# Patient Record
Sex: MALE | Race: WHITE | URBAN - METROPOLITAN AREA
[De-identification: names, ages, dates, MRNs, and addresses within clinical notes are randomized per-mention and may not be internally consistent; named-entity substitution may affect disease eponyms.]

---

## 2017-07-14 ENCOUNTER — HOSPITAL ENCOUNTER (OUTPATIENT)
Dept: ENDOSCOPY | Age: 62
Discharge: HOME | End: 2017-07-14
Attending: INTERNAL MEDICINE | Admitting: INTERNAL MEDICINE

## 2017-07-14 VITALS
OXYGEN SATURATION: 97 % | HEART RATE: 62 BPM | DIASTOLIC BLOOD PRESSURE: 65 MMHG | RESPIRATION RATE: 18 BRPM | TEMPERATURE: 97.6 F | BODY MASS INDEX: 26.33 KG/M2 | HEIGHT: 76 IN | SYSTOLIC BLOOD PRESSURE: 137 MMHG | WEIGHT: 216.2 LBS

## 2017-07-14 RX ORDER — SODIUM CHLORIDE 9 MG/ML
INJECTION, SOLUTION INTRAVENOUS CONTINUOUS
Status: DISCONTINUED | OUTPATIENT
Start: 2017-07-14 | End: 2017-07-15 | Stop reason: HOSPADM

## 2017-07-14 RX ADMIN — SODIUM CHLORIDE: 9 INJECTION, SOLUTION INTRAVENOUS at 12:14

## 2017-07-14 ASSESSMENT — PAIN SCALES - GENERAL
PAINLEVEL_OUTOF10: 0

## 2017-07-14 ASSESSMENT — PAIN DESCRIPTION - DESCRIPTORS: DESCRIPTORS: BURNING

## 2017-07-14 ASSESSMENT — PAIN - FUNCTIONAL ASSESSMENT: PAIN_FUNCTIONAL_ASSESSMENT: 0-10

## 2017-08-07 ENCOUNTER — SURG/PROC ORDERS (OUTPATIENT)
Dept: ANESTHESIOLOGY | Age: 62
End: 2017-08-07

## 2017-08-07 RX ORDER — SODIUM CHLORIDE 0.9 % (FLUSH) 0.9 %
10 SYRINGE (ML) INJECTION PRN
Status: CANCELLED | OUTPATIENT
Start: 2017-08-07

## 2017-08-07 RX ORDER — SODIUM CHLORIDE 0.9 % (FLUSH) 0.9 %
10 SYRINGE (ML) INJECTION EVERY 12 HOURS SCHEDULED
Status: CANCELLED | OUTPATIENT
Start: 2017-08-07

## 2017-08-07 RX ORDER — SODIUM CHLORIDE 9 MG/ML
INJECTION, SOLUTION INTRAVENOUS CONTINUOUS
Status: CANCELLED | OUTPATIENT
Start: 2017-08-07

## 2017-08-08 ENCOUNTER — HOSPITAL ENCOUNTER (OUTPATIENT)
Dept: ENDOSCOPY | Age: 62
Discharge: HOME | End: 2017-08-08
Attending: INTERNAL MEDICINE | Admitting: INTERNAL MEDICINE

## 2017-08-08 VITALS
BODY MASS INDEX: 25.45 KG/M2 | WEIGHT: 209 LBS | TEMPERATURE: 97.9 F | SYSTOLIC BLOOD PRESSURE: 126 MMHG | HEART RATE: 74 BPM | OXYGEN SATURATION: 98 % | RESPIRATION RATE: 16 BRPM | HEIGHT: 76 IN | DIASTOLIC BLOOD PRESSURE: 94 MMHG

## 2017-08-08 RX ORDER — SODIUM CHLORIDE 0.9 % (FLUSH) 0.9 %
10 SYRINGE (ML) INJECTION PRN
Status: DISCONTINUED | OUTPATIENT
Start: 2017-08-08 | End: 2017-08-09 | Stop reason: HOSPADM

## 2017-08-08 RX ORDER — SODIUM CHLORIDE 9 MG/ML
INJECTION, SOLUTION INTRAVENOUS CONTINUOUS
Status: DISCONTINUED | OUTPATIENT
Start: 2017-08-08 | End: 2017-08-09 | Stop reason: HOSPADM

## 2017-08-08 RX ORDER — SODIUM CHLORIDE 0.9 % (FLUSH) 0.9 %
10 SYRINGE (ML) INJECTION EVERY 12 HOURS SCHEDULED
Status: DISCONTINUED | OUTPATIENT
Start: 2017-08-08 | End: 2017-08-09 | Stop reason: HOSPADM

## 2017-08-08 RX ADMIN — SODIUM CHLORIDE: 9 INJECTION, SOLUTION INTRAVENOUS at 08:49

## 2017-08-08 ASSESSMENT — PAIN - FUNCTIONAL ASSESSMENT: PAIN_FUNCTIONAL_ASSESSMENT: 0-10

## 2017-08-08 ASSESSMENT — PAIN SCALES - GENERAL
PAINLEVEL_OUTOF10: 0

## 2017-08-19 PROBLEM — K81.9 CHOLECYSTITIS: Status: ACTIVE | Noted: 2017-08-19

## 2017-08-28 ENCOUNTER — OFFICE VISIT (OUTPATIENT)
Dept: SURGERY | Age: 62
End: 2017-08-28

## 2017-08-28 DIAGNOSIS — K81.9 CHOLECYSTITIS: Primary | ICD-10-CM

## 2017-08-28 PROCEDURE — 99024 POSTOP FOLLOW-UP VISIT: CPT | Performed by: SURGERY

## 2019-04-15 ENCOUNTER — OFFICE VISIT (OUTPATIENT)
Dept: ORTHOPEDIC SURGERY | Age: 64
End: 2019-04-15
Payer: COMMERCIAL

## 2019-04-15 VITALS
DIASTOLIC BLOOD PRESSURE: 87 MMHG | SYSTOLIC BLOOD PRESSURE: 137 MMHG | HEART RATE: 77 BPM | BODY MASS INDEX: 30.44 KG/M2 | HEIGHT: 76 IN | WEIGHT: 250 LBS

## 2019-04-15 DIAGNOSIS — M65.342 TRIGGER RING FINGER OF LEFT HAND: Primary | ICD-10-CM

## 2019-04-15 PROCEDURE — 20550 NJX 1 TENDON SHEATH/LIGAMENT: CPT | Performed by: ORTHOPAEDIC SURGERY

## 2019-04-15 PROCEDURE — 99203 OFFICE O/P NEW LOW 30 MIN: CPT | Performed by: ORTHOPAEDIC SURGERY

## 2019-04-15 NOTE — PROGRESS NOTES
Kenalog:  ND: [id number]  Lot Number: [id number]  Expiration Date: 4/2020
alcohol and the flexor tendon sheath of the left ring finger is injected with 1/2 milliliter of 1% lidocaine and 20 mg.of triamcinalone, with good filling. The patient is advised regarding the expected response and possible reactions from the injection. The patient is asked to call me if full, painless function has not returned within 4 weeks. The possibility of recurrence of the problem is discussed.

## 2019-06-27 ENCOUNTER — OFFICE VISIT (OUTPATIENT)
Dept: ORTHOPEDIC SURGERY | Age: 64
End: 2019-06-27
Payer: COMMERCIAL

## 2019-06-27 VITALS
RESPIRATION RATE: 16 BRPM | BODY MASS INDEX: 30.44 KG/M2 | SYSTOLIC BLOOD PRESSURE: 125 MMHG | HEIGHT: 76 IN | DIASTOLIC BLOOD PRESSURE: 82 MMHG | WEIGHT: 250 LBS

## 2019-06-27 DIAGNOSIS — M65.342 TRIGGER RING FINGER OF LEFT HAND: Primary | ICD-10-CM

## 2019-06-27 PROCEDURE — 99212 OFFICE O/P EST SF 10 MIN: CPT | Performed by: ORTHOPAEDIC SURGERY

## 2019-06-27 NOTE — PROGRESS NOTES
Patient returns to the office for evaluation of   Chief Complaint   Patient presents with    Hand Problem     all fingers are stiff on the left hand, difficulty gripping   I last examined this patient 2 months ago at which time I injected the left ring finger for treatment of trigger finger. He obtained prompt and complete relief of all symptoms. The patient returns to the office requesting additional treatment. He complains of aching in the left distal palm and morning stiffness of all fingers of the left hand. Symptoms have become not changed recently. The patient's social history, past medical history, family history, medications, allergies and review of systems have been reviewed, and dated 4/15/19 and are recorded in the chart. On examination there is mild soft tissue swelling of the digits of the left hand. There is no deformity. There is minimal tenderness, thickening and nodularity at the base of the flexor tendon sheaths, with the exception of the ring finger. Range of motion is not limited in flexion or extension. There is no triggering of any digit, even with manual pressure over the A1 pulleys. Skin is intact without lesions. Distal circulation and sensation are intact. Muscle strength and coordination are normal.  Reflexes and present bilaterally. Joints are stable. There are no subcutaneous nodules or enlarged epitrochlear lymph nodes. Impression: Mild residual tenosynovitis of several fingers, left hand. Previous left ring finger symptoms have fully resolved post injection. The nature of this medical problem is fully discussed with the patient, including all treatment options. All questions are answered. Neither steroid injection nor surgery are indicated at this time. He is advised to take a small dose (200 MG) of ibuprofen at ,  for the next several nights to see if that might help his morning stiffness.

## 2019-08-01 ENCOUNTER — OFFICE VISIT (OUTPATIENT)
Dept: ORTHOPEDIC SURGERY | Age: 64
End: 2019-08-01
Payer: COMMERCIAL

## 2019-08-01 VITALS
WEIGHT: 248 LBS | BODY MASS INDEX: 30.2 KG/M2 | DIASTOLIC BLOOD PRESSURE: 94 MMHG | SYSTOLIC BLOOD PRESSURE: 147 MMHG | RESPIRATION RATE: 16 BRPM | HEIGHT: 76 IN

## 2019-08-01 DIAGNOSIS — M65.9 TENOSYNOVITIS OF LEFT WRIST: Primary | ICD-10-CM

## 2019-08-01 PROCEDURE — 99213 OFFICE O/P EST LOW 20 MIN: CPT | Performed by: ORTHOPAEDIC SURGERY

## 2019-08-01 PROCEDURE — 20605 DRAIN/INJ JOINT/BURSA W/O US: CPT | Performed by: ORTHOPAEDIC SURGERY

## 2019-08-01 NOTE — PROGRESS NOTES
This 62 y.o. right hand dominant retired man is seen today with a chief complaint of pain in the left wrist.  Symptoms have been present for 2 weeks. There is no history of injury. Pain is most severe on the volar aspect. It is mild at rest and aggravated by wrist movement, lifting and gripping. It does not radiate. Swelling has been noticed. There is no distal pain or paresthesias. The previous ring trigger finger continues to do well. Similar pain is not noted in the opposite upper extremity. Symptoms have worsened recently. The pain assessment has been reviewed and is correct. The patient's social history, past medical history, family history, medications, allergies and review of systems, entered 4/15/10, have been reviewed, and dated and are recorded in the chart. On physical examination the patient is Height: 6' 4\" (193 cm) tall and weighs Weight: 248 lb (112.5 kg). Respirations are 18 per minute. The patient is well nourished, is oriented to time and place, demonstrates appropriate mood and affect as well as normal gait and station. There is moderate soft tissue swelling present about the left wrist, palmar. There is no discoloration. There is no deformity or atrophy. Tenderness is present on palpation in this area. Range of motion of the wrist is mildly limited only on the left and is accompanied by mild pain. Skin is intact, as is distal circulation and sensation. Gross muscle strength is normal bilaterally. Hand and wrist joints are stable. There are no subcutaneous nodules or enlarged epitrochlear lymph nodes. Xrays: AP, lateral and oblique Xrays of the left wrist, done in the office today, demonstrate no significant underlying bone or joint disease.  There is severe swelling of the flexor tendons volar to the wrist..    Impression: Flexor tenosynovitis left wrist.    The nature of this medical problem is fully discussed with the patient, including it's relationship to his finger problems and all treatment options. All questions are answered. The left wrist is prepared with Betadine and alcohol and the carpal tunnel is injected with 2 milliliters of a mixture of 1 ml of 1% lidocaine and 40 mg. of Kenalog. The patient is instructed regarding the expected response and possible reactions to the injection as well as appropriate post injection care. They are to call me in one month to report his response The possibility of recurrence is discussed.

## 2019-09-03 ENCOUNTER — TELEPHONE (OUTPATIENT)
Dept: ORTHOPEDIC SURGERY | Age: 64
End: 2019-09-03

## 2019-09-04 ENCOUNTER — TELEPHONE (OUTPATIENT)
Dept: ORTHOPEDIC SURGERY | Age: 64
End: 2019-09-04

## 2020-08-04 ENCOUNTER — APPOINTMENT (OUTPATIENT)
Dept: GENERAL RADIOLOGY | Age: 65
DRG: 177 | End: 2020-08-04
Payer: COMMERCIAL

## 2020-08-04 ENCOUNTER — HOSPITAL ENCOUNTER (INPATIENT)
Age: 65
LOS: 10 days | Discharge: HOME | DRG: 177 | End: 2020-08-14
Attending: EMERGENCY MEDICINE | Admitting: INTERNAL MEDICINE
Payer: COMMERCIAL

## 2020-08-04 PROBLEM — I10 ESSENTIAL HYPERTENSION: Status: ACTIVE | Noted: 2020-08-04

## 2020-08-04 PROBLEM — E78.5 HYPERLIPIDEMIA: Status: ACTIVE | Noted: 2020-08-04

## 2020-08-04 PROBLEM — J96.00 ACUTE RESPIRATORY FAILURE DUE TO COVID-19 (HCC): Status: ACTIVE | Noted: 2020-08-04

## 2020-08-04 PROBLEM — U07.1 ACUTE RESPIRATORY FAILURE DUE TO COVID-19 (HCC): Status: ACTIVE | Noted: 2020-08-04

## 2020-08-04 PROBLEM — U07.1 COVID-19 VIRUS INFECTION: Status: ACTIVE | Noted: 2020-08-04

## 2020-08-04 LAB
A/G RATIO: 1.3 (ref 1.1–2.2)
A/G RATIO: 1.8 (ref 1.1–2.2)
ALBUMIN SERPL-MCNC: 3.3 G/DL (ref 3.4–5)
ALBUMIN SERPL-MCNC: 3.5 G/DL (ref 3.4–5)
ALP BLD-CCNC: 103 U/L (ref 40–129)
ALP BLD-CCNC: 107 U/L (ref 40–129)
ALT SERPL-CCNC: 93 U/L (ref 10–40)
ALT SERPL-CCNC: 96 U/L (ref 10–40)
ANION GAP SERPL CALCULATED.3IONS-SCNC: 15 MMOL/L (ref 3–16)
ANION GAP SERPL CALCULATED.3IONS-SCNC: 18 MMOL/L (ref 3–16)
AST SERPL-CCNC: 138 U/L (ref 15–37)
AST SERPL-CCNC: 144 U/L (ref 15–37)
BASOPHILS ABSOLUTE: 0 K/UL (ref 0–0.2)
BASOPHILS RELATIVE PERCENT: 0.1 %
BILIRUB SERPL-MCNC: 0.8 MG/DL (ref 0–1)
BILIRUB SERPL-MCNC: 0.8 MG/DL (ref 0–1)
BUN BLDV-MCNC: 17 MG/DL (ref 7–20)
BUN BLDV-MCNC: 18 MG/DL (ref 7–20)
CALCIUM SERPL-MCNC: 8.1 MG/DL (ref 8.3–10.6)
CALCIUM SERPL-MCNC: 8.2 MG/DL (ref 8.3–10.6)
CHLORIDE BLD-SCNC: 101 MMOL/L (ref 99–110)
CHLORIDE BLD-SCNC: 103 MMOL/L (ref 99–110)
CO2: 23 MMOL/L (ref 21–32)
CO2: 23 MMOL/L (ref 21–32)
CREAT SERPL-MCNC: 1 MG/DL (ref 0.8–1.3)
CREAT SERPL-MCNC: 1.1 MG/DL (ref 0.8–1.3)
D DIMER: 445 NG/ML DDU (ref 0–229)
EKG ATRIAL RATE: 94 BPM
EKG DIAGNOSIS: NORMAL
EKG P AXIS: 58 DEGREES
EKG P-R INTERVAL: 196 MS
EKG Q-T INTERVAL: 370 MS
EKG QRS DURATION: 88 MS
EKG QTC CALCULATION (BAZETT): 462 MS
EKG R AXIS: 15 DEGREES
EKG T AXIS: 38 DEGREES
EKG VENTRICULAR RATE: 94 BPM
EOSINOPHILS ABSOLUTE: 0 K/UL (ref 0–0.6)
EOSINOPHILS RELATIVE PERCENT: 0 %
FERRITIN: 3295 NG/ML (ref 30–400)
GFR AFRICAN AMERICAN: >60
GFR AFRICAN AMERICAN: >60
GFR NON-AFRICAN AMERICAN: >60
GFR NON-AFRICAN AMERICAN: >60
GLOBULIN: 2 G/DL
GLOBULIN: 2.6 G/DL
GLUCOSE BLD-MCNC: 138 MG/DL (ref 70–99)
GLUCOSE BLD-MCNC: 138 MG/DL (ref 70–99)
HCT VFR BLD CALC: 43.6 % (ref 40.5–52.5)
HEMOGLOBIN: 14.8 G/DL (ref 13.5–17.5)
LACTATE DEHYDROGENASE: 548 U/L (ref 100–190)
LACTIC ACID: 1.5 MMOL/L (ref 0.4–2)
LYMPHOCYTES ABSOLUTE: 0.8 K/UL (ref 1–5.1)
LYMPHOCYTES RELATIVE PERCENT: 11.8 %
MAGNESIUM: 2.1 MG/DL (ref 1.8–2.4)
MAGNESIUM: 2.3 MG/DL (ref 1.8–2.4)
MCH RBC QN AUTO: 29 PG (ref 26–34)
MCHC RBC AUTO-ENTMCNC: 34.1 G/DL (ref 31–36)
MCV RBC AUTO: 85.1 FL (ref 80–100)
MONOCYTES ABSOLUTE: 0.2 K/UL (ref 0–1.3)
MONOCYTES RELATIVE PERCENT: 3.8 %
NEUTROPHILS ABSOLUTE: 5.4 K/UL (ref 1.7–7.7)
NEUTROPHILS RELATIVE PERCENT: 84.3 %
PDW BLD-RTO: 13.2 % (ref 12.4–15.4)
PLATELET # BLD: 132 K/UL (ref 135–450)
PMV BLD AUTO: 7.2 FL (ref 5–10.5)
POTASSIUM REFLEX MAGNESIUM: 3.2 MMOL/L (ref 3.5–5.1)
POTASSIUM REFLEX MAGNESIUM: 3.3 MMOL/L (ref 3.5–5.1)
RBC # BLD: 5.12 M/UL (ref 4.2–5.9)
SODIUM BLD-SCNC: 141 MMOL/L (ref 136–145)
SODIUM BLD-SCNC: 142 MMOL/L (ref 136–145)
TOTAL PROTEIN: 5.5 G/DL (ref 6.4–8.2)
TOTAL PROTEIN: 5.9 G/DL (ref 6.4–8.2)
TROPONIN: <0.01 NG/ML
WBC # BLD: 6.4 K/UL (ref 4–11)

## 2020-08-04 PROCEDURE — 80053 COMPREHEN METABOLIC PANEL: CPT

## 2020-08-04 PROCEDURE — 93010 ELECTROCARDIOGRAM REPORT: CPT | Performed by: INTERNAL MEDICINE

## 2020-08-04 PROCEDURE — 83735 ASSAY OF MAGNESIUM: CPT

## 2020-08-04 PROCEDURE — 71045 X-RAY EXAM CHEST 1 VIEW: CPT

## 2020-08-04 PROCEDURE — 6370000000 HC RX 637 (ALT 250 FOR IP): Performed by: INTERNAL MEDICINE

## 2020-08-04 PROCEDURE — 87040 BLOOD CULTURE FOR BACTERIA: CPT

## 2020-08-04 PROCEDURE — 2580000003 HC RX 258: Performed by: INTERNAL MEDICINE

## 2020-08-04 PROCEDURE — 6370000000 HC RX 637 (ALT 250 FOR IP)

## 2020-08-04 PROCEDURE — 85379 FIBRIN DEGRADATION QUANT: CPT

## 2020-08-04 PROCEDURE — 83615 LACTATE (LD) (LDH) ENZYME: CPT

## 2020-08-04 PROCEDURE — 6360000002 HC RX W HCPCS: Performed by: EMERGENCY MEDICINE

## 2020-08-04 PROCEDURE — 93005 ELECTROCARDIOGRAM TRACING: CPT | Performed by: EMERGENCY MEDICINE

## 2020-08-04 PROCEDURE — 1200000000 HC SEMI PRIVATE

## 2020-08-04 PROCEDURE — 82728 ASSAY OF FERRITIN: CPT

## 2020-08-04 PROCEDURE — 84484 ASSAY OF TROPONIN QUANT: CPT

## 2020-08-04 PROCEDURE — 85025 COMPLETE CBC W/AUTO DIFF WBC: CPT

## 2020-08-04 PROCEDURE — 6360000002 HC RX W HCPCS: Performed by: INTERNAL MEDICINE

## 2020-08-04 PROCEDURE — 83605 ASSAY OF LACTIC ACID: CPT

## 2020-08-04 PROCEDURE — 99285 EMERGENCY DEPT VISIT HI MDM: CPT

## 2020-08-04 RX ORDER — DEXAMETHASONE 6 MG/1
6 TABLET ORAL DAILY
Status: COMPLETED | OUTPATIENT
Start: 2020-08-05 | End: 2020-08-14

## 2020-08-04 RX ORDER — BENZONATATE 100 MG/1
200 CAPSULE ORAL 3 TIMES DAILY PRN
Status: DISCONTINUED | OUTPATIENT
Start: 2020-08-04 | End: 2020-08-14 | Stop reason: HOSPADM

## 2020-08-04 RX ORDER — POLYETHYLENE GLYCOL 3350 17 G/17G
17 POWDER, FOR SOLUTION ORAL DAILY PRN
Status: DISCONTINUED | OUTPATIENT
Start: 2020-08-04 | End: 2020-08-14 | Stop reason: HOSPADM

## 2020-08-04 RX ORDER — LAMOTRIGINE 150 MG/1
150 TABLET ORAL 2 TIMES DAILY
COMMUNITY

## 2020-08-04 RX ORDER — SODIUM CHLORIDE 0.9 % (FLUSH) 0.9 %
10 SYRINGE (ML) INJECTION PRN
Status: DISCONTINUED | OUTPATIENT
Start: 2020-08-04 | End: 2020-08-14 | Stop reason: HOSPADM

## 2020-08-04 RX ORDER — AMLODIPINE BESYLATE 5 MG/1
5 TABLET ORAL DAILY
COMMUNITY

## 2020-08-04 RX ORDER — LISINOPRIL 20 MG/1
20 TABLET ORAL DAILY
Status: DISCONTINUED | OUTPATIENT
Start: 2020-08-05 | End: 2020-08-14 | Stop reason: HOSPADM

## 2020-08-04 RX ORDER — QUETIAPINE FUMARATE 100 MG/1
100 TABLET, FILM COATED ORAL 2 TIMES DAILY
COMMUNITY

## 2020-08-04 RX ORDER — SODIUM CHLORIDE 0.9 % (FLUSH) 0.9 %
10 SYRINGE (ML) INJECTION EVERY 12 HOURS SCHEDULED
Status: DISCONTINUED | OUTPATIENT
Start: 2020-08-04 | End: 2020-08-14 | Stop reason: HOSPADM

## 2020-08-04 RX ORDER — PRAVASTATIN SODIUM 40 MG
40 TABLET ORAL NIGHTLY
Status: ON HOLD | COMMUNITY
End: 2020-08-14 | Stop reason: HOSPADM

## 2020-08-04 RX ORDER — ASPIRIN 81 MG/1
81 TABLET ORAL DAILY
COMMUNITY

## 2020-08-04 RX ORDER — AMLODIPINE BESYLATE 5 MG/1
5 TABLET ORAL DAILY
Status: DISCONTINUED | OUTPATIENT
Start: 2020-08-05 | End: 2020-08-14 | Stop reason: HOSPADM

## 2020-08-04 RX ORDER — DEXAMETHASONE 4 MG/1
6 TABLET ORAL ONCE
Status: COMPLETED | OUTPATIENT
Start: 2020-08-04 | End: 2020-08-04

## 2020-08-04 RX ORDER — POTASSIUM CHLORIDE 750 MG/1
40 TABLET, FILM COATED, EXTENDED RELEASE ORAL ONCE
Status: DISCONTINUED | OUTPATIENT
Start: 2020-08-04 | End: 2020-08-14 | Stop reason: HOSPADM

## 2020-08-04 RX ORDER — QUETIAPINE FUMARATE 100 MG/1
100 TABLET, FILM COATED ORAL 2 TIMES DAILY
Status: DISCONTINUED | OUTPATIENT
Start: 2020-08-04 | End: 2020-08-14 | Stop reason: HOSPADM

## 2020-08-04 RX ORDER — PRAVASTATIN SODIUM 40 MG
40 TABLET ORAL NIGHTLY
Status: DISCONTINUED | OUTPATIENT
Start: 2020-08-04 | End: 2020-08-14 | Stop reason: HOSPADM

## 2020-08-04 RX ORDER — ALBUTEROL SULFATE 90 UG/1
2 AEROSOL, METERED RESPIRATORY (INHALATION) EVERY 6 HOURS PRN
Status: DISCONTINUED | OUTPATIENT
Start: 2020-08-04 | End: 2020-08-14 | Stop reason: HOSPADM

## 2020-08-04 RX ORDER — LISINOPRIL 20 MG/1
20 TABLET ORAL DAILY
COMMUNITY

## 2020-08-04 RX ORDER — ACETAMINOPHEN 650 MG/1
650 SUPPOSITORY RECTAL EVERY 4 HOURS PRN
Status: DISCONTINUED | OUTPATIENT
Start: 2020-08-04 | End: 2020-08-14 | Stop reason: HOSPADM

## 2020-08-04 RX ORDER — ASPIRIN 81 MG/1
81 TABLET ORAL DAILY
Status: DISCONTINUED | OUTPATIENT
Start: 2020-08-05 | End: 2020-08-14 | Stop reason: HOSPADM

## 2020-08-04 RX ORDER — POTASSIUM CHLORIDE 20 MEQ/1
TABLET, EXTENDED RELEASE ORAL
Status: COMPLETED
Start: 2020-08-04 | End: 2020-08-04

## 2020-08-04 RX ORDER — ACETAMINOPHEN 325 MG/1
650 TABLET ORAL EVERY 4 HOURS PRN
Status: DISCONTINUED | OUTPATIENT
Start: 2020-08-04 | End: 2020-08-14 | Stop reason: HOSPADM

## 2020-08-04 RX ORDER — ONDANSETRON 2 MG/ML
4 INJECTION INTRAMUSCULAR; INTRAVENOUS EVERY 4 HOURS PRN
Status: DISCONTINUED | OUTPATIENT
Start: 2020-08-04 | End: 2020-08-14 | Stop reason: HOSPADM

## 2020-08-04 RX ADMIN — DEXAMETHASONE 6 MG: 4 TABLET ORAL at 16:07

## 2020-08-04 RX ADMIN — POTASSIUM CHLORIDE 40 MEQ: 1500 TABLET, EXTENDED RELEASE ORAL at 21:18

## 2020-08-04 RX ADMIN — PRAVASTATIN SODIUM 40 MG: 40 TABLET ORAL at 21:18

## 2020-08-04 RX ADMIN — QUETIAPINE FUMARATE 100 MG: 100 TABLET ORAL at 21:18

## 2020-08-04 RX ADMIN — LAMOTRIGINE 150 MG: 100 TABLET ORAL at 21:18

## 2020-08-04 RX ADMIN — SODIUM CHLORIDE, PRESERVATIVE FREE 10 ML: 5 INJECTION INTRAVENOUS at 21:26

## 2020-08-04 RX ADMIN — ENOXAPARIN SODIUM 30 MG: 40 INJECTION SUBCUTANEOUS at 21:17

## 2020-08-04 ASSESSMENT — PAIN SCALES - GENERAL
PAINLEVEL_OUTOF10: 0
PAINLEVEL_OUTOF10: 7
PAINLEVEL_OUTOF10: 0

## 2020-08-04 ASSESSMENT — PAIN DESCRIPTION - LOCATION: LOCATION: CHEST

## 2020-08-04 ASSESSMENT — ENCOUNTER SYMPTOMS
SORE THROAT: 0
VOMITING: 0
NAUSEA: 0
COUGH: 1
EYE REDNESS: 0
SHORTNESS OF BREATH: 1

## 2020-08-04 ASSESSMENT — PAIN DESCRIPTION - DESCRIPTORS: DESCRIPTORS: DISCOMFORT

## 2020-08-04 ASSESSMENT — PAIN DESCRIPTION - FREQUENCY: FREQUENCY: INTERMITTENT

## 2020-08-04 ASSESSMENT — PAIN DESCRIPTION - PAIN TYPE: TYPE: ACUTE PAIN

## 2020-08-04 NOTE — ED PROVIDER NOTES
[facility]  eMERGENCY dEPARTMENT eNCOUnter      Pt Name: Brittnee Rae  MRN: [id number]  [place] 1955  Date of evaluation: 8/4/2020  Provider: Aguila Mckeon MD    CHIEF COMPLAINT       Chief Complaint   Patient presents with    Shortness of Breath     covid positive. followed up with pcp. stated there was nothing for them to do so he came to ER.  Cough         HISTORY OF PRESENT ILLNESS   (Location/Symptom, Timing/Onset,Context/Setting, Quality, Duration, Modifying Factors, Severity)  Note limiting factors. Brittnee Rae is a 60 y.o. male who presents to the emergency department c/o shortness of breath. This patient states that last Monday he tested positive for COVID. He is on day 8 or 9. He reports that he continues with a dry, nonproductive cough. His shortness of breath is worsening and he has associated fever, body aches. No sore throat. Comorbidities include: Hyperlipidemia and hypertension. His BMI is 29.2    HPI    NursingNotes were reviewed. REVIEW OF SYSTEMS    (2-9 systems for level 4, 10 or more for level 5)     Review of Systems   Constitutional: Positive for fever. HENT: Negative for sore throat. Eyes: Negative for redness. Respiratory: Positive for cough and shortness of breath. Cardiovascular: Negative for chest pain. Gastrointestinal: Negative for nausea and vomiting. Genitourinary: Negative for dysuria and frequency. Musculoskeletal: Positive for myalgias. Skin: Negative for rash. Neurological: Positive for headaches. Psychiatric/Behavioral: Negative for confusion. Except as noted above the remainder of the review of systems was reviewed and negative.        PAST MEDICAL HISTORY     Past Medical History:   Diagnosis Date    GERD (gastroesophageal reflux disease)     Hyperlipidemia     Hypertension     Kidney stone          SURGICALHISTORY       Past Surgical History:   Procedure Laterality Date    CHOLECYSTECTOMY  08/19/2017    Lap    COLONOSCOPY  10/16/2015    CYSTOSCOPY      KIDNEY STONES    ENDOSCOPY, COLON, DIAGNOSTIC  08/08/2017    UPPER GASTROINTESTINAL ENDOSCOPY  07/14/2017         CURRENT MEDICATIONS       Previous Medications    AMLODIPINE (NORVASC) 5 MG TABLET    Take 5 mg by mouth daily    ASPIRIN 81 MG EC TABLET    Take 81 mg by mouth daily    LAMOTRIGINE (LAMICTAL) 150 MG TABLET    Take 150 mg by mouth 2 times daily    LISINOPRIL (PRINIVIL;ZESTRIL) 20 MG TABLET    Take 20 mg by mouth daily    PRAVASTATIN (PRAVACHOL) 40 MG TABLET    Take 40 mg by mouth nightly     QUETIAPINE (SEROQUEL) 100 MG TABLET    Take 100 mg by mouth 2 times daily       ALLERGIES     Bee pollen    FAMILY HISTORY       Family History   Problem Relation Age of Onset    Heart Disease Mother     Stroke Mother     Cancer Father         STOMACH          SOCIAL HISTORY       Social History     Socioeconomic History    Marital status:      Spouse name: Not on file    Number of children: Not on file    Years of education: Not on file    Highest education level: Not on file   Occupational History    Not on file   Social Needs    Financial resource strain: Not on file    Food insecurity     Worry: Not on file     Inability: Not on file    Transportation needs     Medical: Not on file     Non-medical: Not on file   Tobacco Use    Smoking status: Never Smoker    Smokeless tobacco: Never Used   Substance and Sexual Activity    Alcohol use: Yes     Comment: occ    Drug use: No    Sexual activity: Not on file   Lifestyle    Physical activity     Days per week: Not on file     Minutes per session: Not on file    Stress: Not on file   Relationships    Social connections     Talks on phone: Not on file     Gets together: Not on file     Attends Evangelical service: Not on file     Active member of club or organization: Not on file     Attends meetings of clubs or organizations: Not on file     Relationship status: Not on file    Intimate partner violence     Fear of current or ex partner: Not on file     Emotionally abused: Not on file     Physically abused: Not on file     Forced sexual activity: Not on file   Other Topics Concern    Not on file   Social History Narrative    Not on file       SCREENINGS             PHYSICAL EXAM    (up to 7 for level 4, 8 or more for level 5)     ED Triage Vitals [08/04/20 1330]   BP Temp Temp Source Pulse Resp SpO2 Height Weight   108/63 98.1 °F (36.7 °C) Oral 98 22 (!) 85 % 6' 4\" (1.93 m) 239 lb 13.8 oz (108.8 kg)       Physical Exam  Vitals signs and nursing note reviewed. Constitutional:       Appearance: He is well-developed. He is not diaphoretic. HENT:      Head: Normocephalic and atraumatic. Eyes:      General:         Right eye: No discharge. Left eye: No discharge. Conjunctiva/sclera: Conjunctivae normal.   Neck:      Musculoskeletal: Neck supple. Cardiovascular:      Rate and Rhythm: Normal rate. Heart sounds: No murmur. Pulmonary:      Effort: Pulmonary effort is normal. No respiratory distress. Breath sounds: Normal breath sounds. No wheezing, rhonchi or rales. Abdominal:      Palpations: Abdomen is soft. Tenderness: There is no abdominal tenderness. Musculoskeletal: Normal range of motion. Right lower leg: No edema. Left lower leg: No edema. Skin:     General: Skin is warm and dry. Capillary Refill: Capillary refill takes less than 2 seconds. Neurological:      Mental Status: He is alert and oriented to person, place, and time. Psychiatric:         Behavior: Behavior normal.         DIAGNOSTIC RESULTS     EKG: All EKG's are interpreted by the Emergency Department Physician who either signs or Co-signsthis chart in the absence of a cardiologist.    The Ekg interpreted by me shows  normal sinus rhythm with a rate of 94  Axis is   Normal  QTc is  462 msec  Intervals and Durations are unremarkable.       ST Segments: normal  When compared to an EKG performed on July 8, 2017 there are no significant differences between these 2 EKGs        RADIOLOGY:   Non-plain filmimages such as CT, Ultrasound and MRI are read by the radiologist. Plain radiographic images are visualized and preliminarily interpreted by the emergency physician with the below findings:        Interpretation per the Radiologist below, if available at the time ofthis note:    XR CHEST 1 VIEW   Preliminary Result   Multifocal curvilinear and hazy opacities within the bilateral mid and lower   lung zones, most consistent with multifocal pneumonia, to include atypical   causes.                ED BEDSIDE ULTRASOUND:   Performed by ED Physician - none    LABS:  Labs Reviewed   CBC WITH AUTO DIFFERENTIAL - Abnormal; Notable for the following components:       Result Value    Platelets 862 (*)     Lymphocytes Absolute 0.8 (*)     All other components within normal limits    Narrative:     Performed at:  [address]   Phone [phone]   COMPREHENSIVE METABOLIC PANEL W/ REFLEX TO MG FOR LOW K - Abnormal; Notable for the following components:    Potassium reflex Magnesium 3.2 (*)     Glucose 138 (*)     Calcium 8.2 (*)     Total Protein 5.9 (*)     Alb 3.3 (*)     ALT 93 (*)      (*)     All other components within normal limits    Narrative:     Performed at:  [facility]  [address] [facility] [address]   Phone [phone]   D-DIMER, QUANTITATIVE - Abnormal; Notable for the following components:    D-Dimer, Quant 445 (*)     All other components within normal limits    Narrative:     Performed at:  [facility]  [address] [facility] [address]   Phone [phone]   CULTURE, BLOOD 2   CULTURE, BLOOD 1   TROPONIN    Narrative:     Performed at:  [facility]  [address]

## 2020-08-04 NOTE — ED NOTES
Report called to nurse Brown at this time. VS as noted. Questions/concerns addressed. No further concerns voiced at this time.       Tu Gomez RN  08/04/20 [id number]

## 2020-08-04 NOTE — H&P
Hospital Medicine  History and Physical    PCP: Nitza Leighton  Patient Name: Hunter Ulloa    Date of Service: Pt seen/examined on 08/04/2020 and admitted to Inpatient with expected LOS greater than two midnights due to medical therapy    CHIEF COMPLAINT:  Pt c/o cough, shortness of breath  HISTORY OF PRESENT ILLNESS: Patient is a 72-year-old man with history of hypertension and hyperlipidemia who presents to the emergency room for evaluation of cough and shortness of breath. He states that he was diagnosed with COVID-19 is an outpatient on or about 7/27/2020. Approximately three weeks ago he was on vacation with 25 other people at the [facility] in [place]. Since that time several of his companions have been diagnosed with COVID-19. Over the past several days here developed a cough and worsening shortness of breath. In the emergency room he was found to have an oxygen saturation in the mid 80s on room air. He is being admitted for further evaluation and treatment. Associated signs and symptoms do not include fever or chills, nausea, vomiting, abdominal pain, hemoptysis, hematochezia, diarrhea, constipation or urinary symptoms. Past Medical History:        Diagnosis Date    GERD (gastroesophageal reflux disease)     Hyperlipidemia     Hypertension     Kidney stone        Past Surgical History:        Procedure Laterality Date    CHOLECYSTECTOMY  08/19/2017    Lap    COLONOSCOPY  10/16/2015    CYSTOSCOPY      KIDNEY STONES    ENDOSCOPY, COLON, DIAGNOSTIC  08/08/2017    UPPER GASTROINTESTINAL ENDOSCOPY  07/14/2017       Allergies:  Bee pollen    Medications Prior to Admission:    Prior to Admission medications    Medication Sig Start Date End Date Taking?  Authorizing Provider   lisinopril (PRINIVIL;ZESTRIL) 20 MG tablet Take 20 mg by mouth daily   Yes Historical Provider, MD   amLODIPine (NORVASC) 5 MG tablet Take 5 mg by mouth daily   Yes Historical Provider, MD   pravastatin (PRAVACHOL) 40 felt, not easily obliterated with pressures   Skin: Skin color, texture, turgor normal. No rashes or lesions on exposed skin  Neurologic: Neurovascularly intact without any focal sensory/motor deficits. Cranial nerves: II-XII intact, grossly non-focal. Gait was not tested. Psychiatric: Alert and oriented, thought content appropriate, normal insight    CBC:   Recent Labs     08/04/20  1418   WBC 6.4   HGB 14.8   *     BMP:    Recent Labs     08/04/20  1418      K 3.2*      CO2 23   BUN 18   CREATININE 1.0   GLUCOSE 138*     Troponin:   Recent Labs     08/04/20  1418   TROPONINI <0.01     PT/INR:  No results found for: PTINR  U/A:    Lab Results   Component Value Date    LEUKOCYTESUR Negative 08/19/2017    SPECGRAV >1.030 08/19/2017    UROBILINOGEN 1.0 08/19/2017    BILIRUBINUR Negative 08/19/2017    BLOODU Negative 08/19/2017    GLUCOSEU Negative 08/19/2017    PROTEINU Negative 08/19/2017         RAD:   I have independently reviewed and interpreted the imaging studies below and based my recommendations to the patient on those findings. Xr Chest 1 View    Result Date: 8/4/2020  EXAMINATION: ONE XRAY VIEW OF THE CHEST 8/4/2020 2:36 pm COMPARISON: Chest CT 07/08/2017. HISTORY: ORDERING SYSTEM PROVIDED HISTORY: SOB, COVID positive TECHNOLOGIST PROVIDED HISTORY: Reason for exam:->SOB, COVID positive Reason for Exam: [email or web address] Acuity: Acute Type of Exam: Initial FINDINGS: A single frontal view of the chest was obtained. There is no acute skeletal abnormality. The heart size is at the upper limits of normal.  The mediastinal contours are within normal limits. There are multifocal curvilinear hazy opacities within the bilateral mid lower lung zones, consistent with multifocal pneumonia, to include atypical process. There is no evidence of a pneumothorax.      Multifocal curvilinear and hazy opacities within the bilateral mid and lower lung zones, most consistent with multifocal pneumonia, to include atypical causes. Assessment:   Principal Problem:    COVID-19 virus infection  Active Problems:    Acute respiratory failure due to COVID-19    Essential hypertension    Hyperlipidemia  Resolved Problems:    * No resolved hospital problems. *      Plan:       NTBYD-80 virus infection with acute respiratory failure due to COVID-19 - tested positive on or about 7/27/2020  - Decadron started  - Routine labs for COVID patients ordered  - COVID isolation/precautions  - ProAir HFA inhalers as needed  - Antitussive medications as needed    Essential (primary) hypertension - continue home meds and monitor blood pressure    Hyperlipidemia - No current evidence of Rhabdomyolysis or other adverse effects. Continue statin therapy while in the hospital          DVT Prophylaxis: Lovenox  (COVID protocol)  Diet: No diet orders on file  Code Status: Prior  (Advanced care planning has been discussed with patient and/or responsible family member and is reflected in the code status.  Further orders associated with this have been entered if appropriate)    Disposition: Anticipate that patient will remain in the hospital for 2 to 3 days depending on further evaluation and clinical course    Please note that over 50 minutes was spent in evaluating the patient, review of records and results, discussion with staff/family, etc.    Codie Dong MD

## 2020-08-05 LAB
A/G RATIO: 1.2 (ref 1.1–2.2)
ALBUMIN SERPL-MCNC: 3.2 G/DL (ref 3.4–5)
ALP BLD-CCNC: 115 U/L (ref 40–129)
ALT SERPL-CCNC: 140 U/L (ref 10–40)
ANION GAP SERPL CALCULATED.3IONS-SCNC: 12 MMOL/L (ref 3–16)
APTT: 31 SEC (ref 24.2–36.2)
AST SERPL-CCNC: 189 U/L (ref 15–37)
BASOPHILS ABSOLUTE: 0 K/UL (ref 0–0.2)
BASOPHILS RELATIVE PERCENT: 0.2 %
BILIRUB SERPL-MCNC: 0.5 MG/DL (ref 0–1)
BUN BLDV-MCNC: 19 MG/DL (ref 7–20)
CALCIUM SERPL-MCNC: 8.1 MG/DL (ref 8.3–10.6)
CHLORIDE BLD-SCNC: 102 MMOL/L (ref 99–110)
CO2: 25 MMOL/L (ref 21–32)
CREAT SERPL-MCNC: 0.8 MG/DL (ref 0.8–1.3)
D DIMER: 345 NG/ML DDU (ref 0–229)
EOSINOPHILS ABSOLUTE: 0 K/UL (ref 0–0.6)
EOSINOPHILS RELATIVE PERCENT: 0 %
FIBRINOGEN: 523 MG/DL (ref 200–397)
GFR AFRICAN AMERICAN: >60
GFR NON-AFRICAN AMERICAN: >60
GLOBULIN: 2.6 G/DL
GLUCOSE BLD-MCNC: 163 MG/DL (ref 70–99)
HCT VFR BLD CALC: 41 % (ref 40.5–52.5)
HEMOGLOBIN: 14.3 G/DL (ref 13.5–17.5)
INR BLD: 1.02 (ref 0.86–1.14)
LYMPHOCYTES ABSOLUTE: 0.6 K/UL (ref 1–5.1)
LYMPHOCYTES RELATIVE PERCENT: 10.6 %
MCH RBC QN AUTO: 29.3 PG (ref 26–34)
MCHC RBC AUTO-ENTMCNC: 35 G/DL (ref 31–36)
MCV RBC AUTO: 83.9 FL (ref 80–100)
MONOCYTES ABSOLUTE: 0.2 K/UL (ref 0–1.3)
MONOCYTES RELATIVE PERCENT: 4.5 %
NEUTROPHILS ABSOLUTE: 4.7 K/UL (ref 1.7–7.7)
NEUTROPHILS RELATIVE PERCENT: 84.7 %
PDW BLD-RTO: 13.1 % (ref 12.4–15.4)
PLATELET # BLD: 146 K/UL (ref 135–450)
PMV BLD AUTO: 7.1 FL (ref 5–10.5)
POTASSIUM REFLEX MAGNESIUM: 4 MMOL/L (ref 3.5–5.1)
PROTHROMBIN TIME: 11.8 SEC (ref 10–13.2)
RBC # BLD: 4.88 M/UL (ref 4.2–5.9)
SARS-COV-2, NAAT: DETECTED
SODIUM BLD-SCNC: 139 MMOL/L (ref 136–145)
TOTAL PROTEIN: 5.8 G/DL (ref 6.4–8.2)
WBC # BLD: 5.5 K/UL (ref 4–11)

## 2020-08-05 PROCEDURE — 6360000002 HC RX W HCPCS: Performed by: INTERNAL MEDICINE

## 2020-08-05 PROCEDURE — 94761 N-INVAS EAR/PLS OXIMETRY MLT: CPT

## 2020-08-05 PROCEDURE — 6370000000 HC RX 637 (ALT 250 FOR IP): Performed by: INTERNAL MEDICINE

## 2020-08-05 PROCEDURE — 36415 COLL VENOUS BLD VENIPUNCTURE: CPT

## 2020-08-05 PROCEDURE — 85610 PROTHROMBIN TIME: CPT

## 2020-08-05 PROCEDURE — 85025 COMPLETE CBC W/AUTO DIFF WBC: CPT

## 2020-08-05 PROCEDURE — 85730 THROMBOPLASTIN TIME PARTIAL: CPT

## 2020-08-05 PROCEDURE — 1200000000 HC SEMI PRIVATE

## 2020-08-05 PROCEDURE — 99255 IP/OBS CONSLTJ NEW/EST HI 80: CPT | Performed by: INTERNAL MEDICINE

## 2020-08-05 PROCEDURE — 2500000003 HC RX 250 WO HCPCS: Performed by: INTERNAL MEDICINE

## 2020-08-05 PROCEDURE — 80053 COMPREHEN METABOLIC PANEL: CPT

## 2020-08-05 PROCEDURE — 85384 FIBRINOGEN ACTIVITY: CPT

## 2020-08-05 PROCEDURE — 2700000000 HC OXYGEN THERAPY PER DAY

## 2020-08-05 PROCEDURE — 85379 FIBRIN DEGRADATION QUANT: CPT

## 2020-08-05 PROCEDURE — 2580000003 HC RX 258: Performed by: INTERNAL MEDICINE

## 2020-08-05 PROCEDURE — U0002 COVID-19 LAB TEST NON-CDC: HCPCS

## 2020-08-05 RX ADMIN — QUETIAPINE FUMARATE 100 MG: 100 TABLET ORAL at 20:36

## 2020-08-05 RX ADMIN — ENOXAPARIN SODIUM 30 MG: 40 INJECTION SUBCUTANEOUS at 20:36

## 2020-08-05 RX ADMIN — SODIUM CHLORIDE, PRESERVATIVE FREE 10 ML: 5 INJECTION INTRAVENOUS at 09:26

## 2020-08-05 RX ADMIN — DEXAMETHASONE 6 MG: 6 TABLET ORAL at 09:25

## 2020-08-05 RX ADMIN — LISINOPRIL 20 MG: 20 TABLET ORAL at 09:26

## 2020-08-05 RX ADMIN — AMLODIPINE BESYLATE 5 MG: 5 TABLET ORAL at 09:25

## 2020-08-05 RX ADMIN — QUETIAPINE FUMARATE 100 MG: 100 TABLET ORAL at 09:26

## 2020-08-05 RX ADMIN — SODIUM CHLORIDE, PRESERVATIVE FREE 10 ML: 5 INJECTION INTRAVENOUS at 20:35

## 2020-08-05 RX ADMIN — GUAIFENESIN AND DEXTROMETHORPHAN HYDROBROMIDE 1 TABLET: 600; 30 TABLET, EXTENDED RELEASE ORAL at 15:57

## 2020-08-05 RX ADMIN — REMDESIVIR 200 MG: 100 INJECTION, POWDER, LYOPHILIZED, FOR SOLUTION INTRAVENOUS at 23:14

## 2020-08-05 RX ADMIN — LAMOTRIGINE 150 MG: 100 TABLET ORAL at 20:35

## 2020-08-05 RX ADMIN — ASPIRIN 81 MG: 81 TABLET, COATED ORAL at 09:25

## 2020-08-05 RX ADMIN — ENOXAPARIN SODIUM 30 MG: 40 INJECTION SUBCUTANEOUS at 09:26

## 2020-08-05 RX ADMIN — LAMOTRIGINE 150 MG: 100 TABLET ORAL at 09:25

## 2020-08-05 ASSESSMENT — PAIN SCALES - GENERAL
PAINLEVEL_OUTOF10: 0

## 2020-08-05 NOTE — PROGRESS NOTES
lung zones, most consistent with multifocal pneumonia, to include atypical   causes. Assessment/Plan:    COVID-19 pneumonia  Decadron 6 mg daily for 10 days  Lovenox twice daily  Infectious disease consult for remdesivir  Consider convalescent plasma if needed  We will hold on Actemra due to recent studies    Acute respiratory failure with hypoxia  Secondary to COVID-19 pneumonia  Continue to titrate oxygen to keep saturations above 90%     Essential (primary) hypertension - continue home meds and monitor blood pressure     Hyperlipidemia - No current evidence of Rhabdomyolysis or other adverse effects.  Continue statin therapy while in the hospital    DVT Prophylaxis: Lovenox  Diet: DIET GENERAL;  Code Status: Full Code    PT/OT Eval Status: Not applicable    Dispo -inpatient    Jose Jay MD

## 2020-08-05 NOTE — PROGRESS NOTES
RN placed pt on High Flow Nasal Cannula 6 LPM. sats 91%.  Will monitor    Electronically signed by Yuri Lynn RCP on 8/4/2020 at 11:37 PM

## 2020-08-05 NOTE — PROGRESS NOTES
4 Eyes Skin Assessment     The patient is being assess for  Admission    I agree that 2 RN's have performed a thorough Head to Toe Skin Assessment on the patient. ALL assessment sites listed below have been assessed. Areas assessed by both nurses:  [x]   Head, Face, and Ears   [x]   Shoulders, Back, and Chest  [x]   Arms, Elbows, and Hands   [x]   Coccyx, Sacrum, and IschIum  [x]   Legs, Feet, and Heels        Does the Patient have Skin Breakdown?   No         Rustam Prevention initiated:  Yes   Wound Care Orders initiated:  NA      [facility] nurse consulted for Pressure Injury (Stage 3,4, Unstageable, DTI, NWPT, and Complex wounds), New and Established Ostomies:  NA      Nurse 1 eSignature: Electronically signed by Endy Sherman RN on 8/4/20 at 8:05 PM EDT    **SHARE this note so that the co-signing nurse is able to place an eSignature**    Nurse 2 eSignature: {Esignature:[id number]}

## 2020-08-05 NOTE — PROGRESS NOTES
Physician Progress Note      Maranda Milligan  CSN #:                  [id number]  :                       1955  ADMIT DATE:       2020 1:25 PM  [address] [place] DATE:  RESPONDING  PROVIDER #:        Prashanth Sharpe MD          QUERY TEXT:    Pt admitted with SOB, Hypoxia, Cough and noted to have Covid-19 virus   infection. Please document in progress notes and discharge summary if you are   evaluating or treating any of the following: The medical record reflects the following:  Risk Factors: 64yo, Hx HTN, HLD  Clinical Indicators: O2 sat 85% on RA in ED, c/o cough and SOB, CXR-Multifocal   curvilinear and hazy opacities within the bilateral mid and lowerlung zones,   most consistent with multifocal pneumonia, to include atypical causes  Treatment: Isolation precautions, High flow O2, inhalers, prn cough med,   decadron, prn tylenol  Options provided:  -- Pneumonia due to COVID-19  -- Acute bronchitis due to COVID-19  -- Acute lower respiratory infection due to COVID-19  -- Other - I will add my own diagnosis  -- Disagree - Not applicable / Not valid  -- Disagree - Clinically unable to determine / Unknown  -- Refer to Clinical Documentation Reviewer    PROVIDER RESPONSE TEXT:    This patient has pneumonia due to COVID-19. Query created by:  [id number] JANIE Damon on 2020 10:42 AM      Electronically signed by:  Prashanth Sharpe MD 2020 1:34 PM

## 2020-08-05 NOTE — PLAN OF CARE
Problem: Airway Clearance - Ineffective  Goal: Achieve or maintain patent airway  Outcome: Ongoing     Problem: Gas Exchange - Impaired  Goal: Absence of hypoxia  Outcome: Ongoing  Goal: Promote optimal lung function  Outcome: Ongoing     Problem: Breathing Pattern - Ineffective  Goal: Ability to achieve and maintain a regular respiratory rate  Outcome: Ongoing     Problem:  Body Temperature -  Risk of, Imbalanced  Goal: Ability to maintain a body temperature within defined limits  Outcome: Ongoing  Goal: Will regain or maintain usual level of consciousness  Outcome: Ongoing  Goal: Complications related to the disease process, condition or treatment will be avoided or minimized  Outcome: Ongoing     Problem: Isolation Precautions - Risk of Spread of Infection  Goal: Prevent transmission of infection  Outcome: Ongoing     Problem: Nutrition Deficits  Goal: Optimize nutrtional status  Outcome: Ongoing     Problem: Risk for Fluid Volume Deficit  Goal: Maintain normal heart rhythm  Outcome: Ongoing  Goal: Maintain absence of muscle cramping  Outcome: Ongoing  Goal: Maintain normal serum potassium, sodium, calcium, phosphorus, and pH  Outcome: Ongoing     Problem: Loneliness or Risk for Loneliness  Goal: Demonstrate positive use of time alone when socialization is not possible  Outcome: Ongoing     Problem: Fatigue  Goal: Verbalize increase energy and improved vitality  Outcome: Ongoing     Problem: Patient Education: Go to Patient Education Activity  Goal: Patient/Family Education  Outcome: Ongoing     Problem: Falls - Risk of:  Goal: Will remain free from falls  Description: Will remain free from falls  Outcome: Ongoing  Goal: Absence of physical injury  Description: Absence of physical injury  Outcome: Ongoing

## 2020-08-05 NOTE — CONSULTS
Infectious Diseases Inpatient Consult Note      Reason for Consult:  COVID-19 pneumonia      Requesting Physician:       Primary Care Physician: Ashley Hayden    History Obtained From: Medical records     CHIEF COMPLAINT:     Chief Complaint   Patient presents with    Shortness of Breath     covid positive. followed up with pcp. stated there was nothing for them to do so he came to ER.  Cough      HISTORY OF PRESENT ILLNESS:  60 y.o. man admitted on 8/4 with fevers, sob, cough was diagnosed with COVID -19 infection as  Outpatient and we cannot see the results in Epic or in care every where but apparently had other people with him tested positive for COVID-19 per HPI as pt was recently out on vacation. He has HTN, AND hyperlipidemia. Fevers at home now in hospital at 99.5 and  Elevation in Lfts, creat normal , WBC normal with Lymphopenia. CXR with bi lateral PNA noted and he is on  9 lts nasal cannula as his Oxygen requirements are going up we are consulted for recommendations.          Past Medical History:    Past Medical History:   Diagnosis Date    GERD (gastroesophageal reflux disease)     Hyperlipidemia     Hypertension     Kidney stone        Past Surgical History:    Past Surgical History:   Procedure Laterality Date    CHOLECYSTECTOMY  08/19/2017    Lap    COLONOSCOPY  10/16/2015    CYSTOSCOPY      KIDNEY STONES    ENDOSCOPY, COLON, DIAGNOSTIC  08/08/2017    UPPER GASTROINTESTINAL ENDOSCOPY  07/14/2017       Current Medications:    Outpatient Medications Marked as Taking for the 8/4/20 encounter [facility] HOSPITAL Encounter)   Medication Sig Dispense Refill    lisinopril (PRINIVIL;ZESTRIL) 20 MG tablet Take 20 mg by mouth daily      amLODIPine (NORVASC) 5 MG tablet Take 5 mg by mouth daily      pravastatin (PRAVACHOL) 40 MG tablet Take 40 mg by mouth nightly       lamoTRIgine (LAMICTAL) 150 MG tablet Take 150 mg by mouth 2 times daily      QUEtiapine (SEROQUEL) 100 MG tablet Take 100 mg by mouth 2 times daily      aspirin 81 MG EC tablet Take 81 mg by mouth daily         Allergies:  Bee pollen    Immunizations : There is no immunization history on file for this patient. Social History:    Social History     Tobacco Use    Smoking status: Never Smoker    Smokeless tobacco: Never Used   Substance Use Topics    Alcohol use: Yes     Comment: occ    Drug use: No     Social History     Tobacco Use   Smoking Status Never Smoker   Smokeless Tobacco Never Used      Family History   Problem Relation Age of Onset    Heart Disease Mother     Stroke Mother     Cancer Father         STOMACH         REVIEW OF SYSTEMS:    No fever / chills / sweats. No weight loss. No visual change, eye pain, eye discharge. No oral lesion, sore throat, dysphagia. Denies cough / sputum/Sob   Denies chest pain, palpitations/ dizziness  Denies nausea/ vomiting/abdominal pain/diarrhea. Denies dysuria or change in urinary function. Denies joint swelling or pain. No myalgia, arthralgia. No rashes, skin lesions   Denies focal weakness, sensory change or other neurologic symptoms  No lymph node swelling or tenderness. Cough, sob, fevers   PHYSICAL EXAM:      Vitals:    /62   Pulse 78   Temp 98.2 °F (36.8 °C) (Oral)   Resp 20   Ht 6' 4\" (1.93 m)   Wt 234 lb 5.6 oz (106.3 kg)   SpO2 91%   BMI 28.53 kg/m²   PHYSICAL EXAM:     In-person bedside physical examination deferred. Pursuant to the emergency declaration under the [facility], [address] [place] [address] authority and the [employer] Act, this clinical encounter was conducted to provide necessary medical care.    (Also consistent with new provisions and guidance offered by [facility] on March 18, 2020 in setting of COVID 19 outbreak and in order to preserve personal protective equipment in accordance with the flexibilities announced by CMS on March 30, Oral BID    aspirin  81 mg Oral Daily    amLODIPine  5 mg Oral Daily    sodium chloride flush  10 mL Intravenous 2 times per day    enoxaparin  30 mg Subcutaneous BID    dexamethasone  6 mg Oral Daily    potassium chloride  40 mEq Oral Once       Continuous Infusions:      PRN Meds:  dextromethorphan-guaiFENesin, sodium chloride flush, acetaminophen **OR** acetaminophen, polyethylene glycol, ondansetron, albuterol sulfate HFA, benzonatate    Lactic acid  1.5     Ferritin  3295     D Dimer   345     MICRO: cultures reviewed and updated by me   [facility] [id number]   Results     Procedure  Component  Value  Units  Date/Time    Culture, Blood 2 [[id number]]   Collected: 08/04/20 1418    Order Status: Sent  Specimen: Blood  Updated: 08/04/20 1429    Culture, Blood 1 [[id number]]   Collected: 08/04/20 1418    Order Status: Sent  Specimen: Blood  Updated: 08/0         Urine Culture  No results for input(s): [address] in the last 72 hours. Imaging:   XR CHEST 1 VIEW   Preliminary Result   Multifocal curvilinear and hazy opacities within the bilateral mid and lower   lung zones, most consistent with multifocal pneumonia, to include atypical   causes. All pertinent images and reports for the current Hospitalization were reviewed by me.     IMPRESSION:    Patient Active Problem List   Diagnosis    Cholecystitis    Tenosynovitis of left wrist    COVID-19 virus infection    Acute respiratory failure due to COVID-19    Essential hypertension    Hyperlipidemia       COVID19 infection   COVID-19 Pneumonia  WBC normal with Lymphopenia  Ferritin elevation  Hypoxia using 9 lts nasal cannula  HTN   Obesity +  Presentation consistent with COVID-19 PNA    Pt had out pt COVID-19 testing and we cannot locate the results not in Epic and not in care every where  Will initiate IV Remdesivir and creat stable will consider Plasma therapy as reports now indicate giving early is more beneficial than later     Labs, Microbiology, Radiology and pertinent results from current hospitalization and care every where were reviewed by me as a part of the consultation. PLAN :  1. Check COVID-19 rapid testing   2. IV Remdesivir x 200 mg loading followed by 100 mg daily x 4 days   3. Cont IV Dexamethasone   4. Type and screen   5. Will consider convalescent plasma if not improving sooner than later    6. I spoke to his wife about IV Remdesivir and convalescent plasma as she left me a message   7. CMP daily x 5 days      Discussed with patient/Family and Nursing d/w Pharmacy       Risk of Complications/Morbidity: High      · Illness(es)/ Infection present that pose threat to bodily function. · There is potential for severe exacerbation of infection/side effects of treatment. · Therapy requires intensive monitoring for antimicrobial agent toxicity. Thanks for allowing me to participate in your patient's care please call me with any questions or concerns.     Dr. Shaan Goodson MD  [facility] Physician  Phone: [phone]   Fax : [phone]

## 2020-08-05 NOTE — PLAN OF CARE
Problem: Airway Clearance - Ineffective  Goal: Achieve or maintain patent airway  8/5/2020 1255 by Simin Blood RN  Outcome: Ongoing     Problem: Gas Exchange - Impaired  Goal: Absence of hypoxia  8/5/2020 1255 by Simin Blood RN  Outcome: Ongoing     Problem: Gas Exchange - Impaired  Goal: Promote optimal lung function  8/5/2020 1255 by Simin Blood RN  Outcome: Ongoing     Problem: Breathing Pattern - Ineffective  Goal: Ability to achieve and maintain a regular respiratory rate  8/5/2020 1255 by Simin Blood RN  Outcome: Ongoing     Problem: Body Temperature -  Risk of, Imbalanced  Goal: Ability to maintain a body temperature within defined limits  8/5/2020 1255 by Simin Blood RN  Outcome: Ongoing     Problem: Body Temperature -  Risk of, Imbalanced  Goal: Will regain or maintain usual level of consciousness  8/5/2020 1255 by Simin Blood RN  Outcome: Ongoing     Problem:  Body Temperature -  Risk of, Imbalanced  Goal: Complications related to the disease process, condition or treatment will be avoided or minimized  8/5/2020 1255 by Simin Blood RN  Outcome: Ongoing     Problem: Isolation Precautions - Risk of Spread of Infection  Goal: Prevent transmission of infection  8/5/2020 1255 by Simin Blood RN  Outcome: Ongoing     Problem: Nutrition Deficits  Goal: Optimize nutrtional status  8/5/2020 1255 by Simin Blood RN  Outcome: Ongoing     Problem: Risk for Fluid Volume Deficit  Goal: Maintain normal heart rhythm  8/5/2020 1255 by Simin Blood RN  Outcome: Ongoing     Problem: Risk for Fluid Volume Deficit  Goal: Maintain absence of muscle cramping  8/5/2020 1255 by Simin Blood RN  Outcome: Ongoing     Problem: Risk for Fluid Volume Deficit  Goal: Maintain normal serum potassium, sodium, calcium, phosphorus, and pH  8/5/2020 1255 by Simin Blood RN  Outcome: Ongoing     Problem: Loneliness or Risk for Loneliness  Goal: Demonstrate positive use of time alone when

## 2020-08-05 NOTE — PROGRESS NOTES
Comprehensive Nutrition Assessment    Type and Reason for Visit:  Initial, Positive Nutrition Screen    Nutrition Recommendations/Plan:   Continue diet free of therapeutic restrictions   Ensure BID  Will monitor nutritional adequacy, nutrition-related labs, weights, BMs, and clinical progress     Nutrition Assessment:  + Screen for malnutrition score of 2. Pt presented with cough, SOB. He was previously dx with Covid. Would assume intake poor d/t acute illness. No weights in EMR to evaluate weight trends. Will trial supplement. Malnutrition Assessment:  Malnutrition Status:  Insufficient data      Due to current CDC guidelines recommending 6-ft distancing for social isolation for COVID19 prevention, NFPE/malnutrition assessment was deferred at this time. Estimated Daily Nutrient Needs:  Energy (kcal):  6270-2319 kcal (20-22 kcal/kg ABW); Weight Used for Energy Requirements:  Current     Protein (g):  110-129 gm (1.2-1.4 gm/kg IBW); Weight Used for Protein Requirements:  Ideal        Fluid (ml/day):  1 ml/kcal; Weight Used for Fluid Requirements:  Current      Nutrition Related Findings:  no edema      Wounds:  None       Current Nutrition Therapies:    DIET GENERAL; Anthropometric Measures:  · Height: 6' 4\" (193 cm)  · Current Body Weight: 234 lb (106.1 kg)   · Admission Body Weight: 239 lb (108.4 kg)(UBW)     · Ideal Body Weight: 202 lbs; % Ideal Body Weight 115.8 %   · BMI: 28.5  · Adjusted Body Weight:  ; No Adjustment   · BMI Categories: Overweight (BMI 25.0-29. 9)       Nutrition Diagnosis:   · Inadequate oral intake related to (acute illness) as evidenced by poor intake prior to admission      Nutrition Interventions:   Food and/or Nutrient Delivery:  Continue Current Diet, Start Oral Nutrition Supplement  Nutrition Education/Counseling:  No recommendation at this time   Coordination of Nutrition Care:  Continued Inpatient Monitoring    Goals:   Tolerate diet and consume greater than 50% of meals and supplements this admission       Nutrition Monitoring and Evaluation:   Behavioral-Environmental Outcomes: Other (Comment)(NA)   Food/Nutrient Intake Outcomes:  Food and Nutrient Intake, Supplement Intake  Physical Signs/Symptoms Outcomes:  Hemodynamic Status, Biochemical Data, Nausea or Vomiting, Nutrition Focused Physical Findings, Skin, Weight     Discharge Planning:     Too soon to determine     Electronically signed by Bakari Kaminski RD, LD on 8/5/20 at 10:02 AM EDT    Contact: [phone]

## 2020-08-06 LAB
A/G RATIO: 1.2 (ref 1.1–2.2)
ABO/RH: NORMAL
ALBUMIN SERPL-MCNC: 3 G/DL (ref 3.4–5)
ALP BLD-CCNC: 106 U/L (ref 40–129)
ALT SERPL-CCNC: 223 U/L (ref 10–40)
ANION GAP SERPL CALCULATED.3IONS-SCNC: 12 MMOL/L (ref 3–16)
ANTIBODY SCREEN: NORMAL
APTT: 27.2 SEC (ref 24.2–36.2)
AST SERPL-CCNC: 209 U/L (ref 15–37)
BASOPHILS ABSOLUTE: 0 K/UL (ref 0–0.2)
BASOPHILS RELATIVE PERCENT: 0.1 %
BILIRUB SERPL-MCNC: 0.6 MG/DL (ref 0–1)
BUN BLDV-MCNC: 28 MG/DL (ref 7–20)
C-REACTIVE PROTEIN: 50.5 MG/L (ref 0–5.1)
CALCIUM SERPL-MCNC: 7.8 MG/DL (ref 8.3–10.6)
CHLORIDE BLD-SCNC: 104 MMOL/L (ref 99–110)
CO2: 26 MMOL/L (ref 21–32)
CREAT SERPL-MCNC: 0.7 MG/DL (ref 0.8–1.3)
D DIMER: 306 NG/ML DDU (ref 0–229)
EOSINOPHILS ABSOLUTE: 0 K/UL (ref 0–0.6)
EOSINOPHILS RELATIVE PERCENT: 0 %
FERRITIN: 5636 NG/ML (ref 30–400)
GFR AFRICAN AMERICAN: >60
GFR NON-AFRICAN AMERICAN: >60
GLOBULIN: 2.6 G/DL
GLUCOSE BLD-MCNC: 172 MG/DL (ref 70–99)
HCT VFR BLD CALC: 41 % (ref 40.5–52.5)
HEMOGLOBIN: 14.2 G/DL (ref 13.5–17.5)
INR BLD: 0.89 (ref 0.86–1.14)
LYMPHOCYTES ABSOLUTE: 0.7 K/UL (ref 1–5.1)
LYMPHOCYTES RELATIVE PERCENT: 7.8 %
MCH RBC QN AUTO: 29.2 PG (ref 26–34)
MCHC RBC AUTO-ENTMCNC: 34.5 G/DL (ref 31–36)
MCV RBC AUTO: 84.7 FL (ref 80–100)
MONOCYTES ABSOLUTE: 0.5 K/UL (ref 0–1.3)
MONOCYTES RELATIVE PERCENT: 5.3 %
NEUTROPHILS ABSOLUTE: 8.2 K/UL (ref 1.7–7.7)
NEUTROPHILS RELATIVE PERCENT: 86.8 %
PDW BLD-RTO: 13.2 % (ref 12.4–15.4)
PLATELET # BLD: 176 K/UL (ref 135–450)
PMV BLD AUTO: 7.2 FL (ref 5–10.5)
POTASSIUM REFLEX MAGNESIUM: 3.9 MMOL/L (ref 3.5–5.1)
PROTHROMBIN TIME: 10.3 SEC (ref 10–13.2)
RBC # BLD: 4.84 M/UL (ref 4.2–5.9)
SODIUM BLD-SCNC: 142 MMOL/L (ref 136–145)
TOTAL PROTEIN: 5.6 G/DL (ref 6.4–8.2)
WBC # BLD: 9.2 K/UL (ref 4–11)

## 2020-08-06 PROCEDURE — 6370000000 HC RX 637 (ALT 250 FOR IP): Performed by: INTERNAL MEDICINE

## 2020-08-06 PROCEDURE — 6360000002 HC RX W HCPCS: Performed by: INTERNAL MEDICINE

## 2020-08-06 PROCEDURE — 85610 PROTHROMBIN TIME: CPT

## 2020-08-06 PROCEDURE — 82728 ASSAY OF FERRITIN: CPT

## 2020-08-06 PROCEDURE — 94761 N-INVAS EAR/PLS OXIMETRY MLT: CPT

## 2020-08-06 PROCEDURE — 86901 BLOOD TYPING SEROLOGIC RH(D): CPT

## 2020-08-06 PROCEDURE — 2700000000 HC OXYGEN THERAPY PER DAY

## 2020-08-06 PROCEDURE — 80053 COMPREHEN METABOLIC PANEL: CPT

## 2020-08-06 PROCEDURE — 86900 BLOOD TYPING SEROLOGIC ABO: CPT

## 2020-08-06 PROCEDURE — 36415 COLL VENOUS BLD VENIPUNCTURE: CPT

## 2020-08-06 PROCEDURE — 86140 C-REACTIVE PROTEIN: CPT

## 2020-08-06 PROCEDURE — 1200000000 HC SEMI PRIVATE

## 2020-08-06 PROCEDURE — 85379 FIBRIN DEGRADATION QUANT: CPT

## 2020-08-06 PROCEDURE — 99233 SBSQ HOSP IP/OBS HIGH 50: CPT | Performed by: INTERNAL MEDICINE

## 2020-08-06 PROCEDURE — 85025 COMPLETE CBC W/AUTO DIFF WBC: CPT

## 2020-08-06 PROCEDURE — 2580000003 HC RX 258: Performed by: INTERNAL MEDICINE

## 2020-08-06 PROCEDURE — 86850 RBC ANTIBODY SCREEN: CPT

## 2020-08-06 PROCEDURE — 85730 THROMBOPLASTIN TIME PARTIAL: CPT

## 2020-08-06 RX ORDER — DIPHENHYDRAMINE HYDROCHLORIDE 50 MG/ML
25 INJECTION INTRAMUSCULAR; INTRAVENOUS ONCE
Status: COMPLETED | OUTPATIENT
Start: 2020-08-06 | End: 2020-08-06

## 2020-08-06 RX ORDER — 0.9 % SODIUM CHLORIDE 0.9 %
20 INTRAVENOUS SOLUTION INTRAVENOUS ONCE
Status: DISCONTINUED | OUTPATIENT
Start: 2020-08-06 | End: 2020-08-13

## 2020-08-06 RX ADMIN — DIPHENHYDRAMINE HYDROCHLORIDE 25 MG: 50 INJECTION, SOLUTION INTRAMUSCULAR; INTRAVENOUS at 23:39

## 2020-08-06 RX ADMIN — ENOXAPARIN SODIUM 30 MG: 40 INJECTION SUBCUTANEOUS at 20:51

## 2020-08-06 RX ADMIN — HYDROCORTISONE SODIUM SUCCINATE 100 MG: 100 INJECTION, POWDER, FOR SOLUTION INTRAMUSCULAR; INTRAVENOUS at 23:39

## 2020-08-06 RX ADMIN — SODIUM CHLORIDE, PRESERVATIVE FREE 10 ML: 5 INJECTION INTRAVENOUS at 08:48

## 2020-08-06 RX ADMIN — LAMOTRIGINE 150 MG: 100 TABLET ORAL at 08:46

## 2020-08-06 RX ADMIN — QUETIAPINE FUMARATE 100 MG: 100 TABLET ORAL at 08:46

## 2020-08-06 RX ADMIN — AMLODIPINE BESYLATE 5 MG: 5 TABLET ORAL at 08:46

## 2020-08-06 RX ADMIN — QUETIAPINE FUMARATE 100 MG: 100 TABLET ORAL at 20:51

## 2020-08-06 RX ADMIN — DEXAMETHASONE 6 MG: 6 TABLET ORAL at 08:46

## 2020-08-06 RX ADMIN — LISINOPRIL 20 MG: 20 TABLET ORAL at 08:46

## 2020-08-06 RX ADMIN — ASPIRIN 81 MG: 81 TABLET, COATED ORAL at 08:46

## 2020-08-06 RX ADMIN — SODIUM CHLORIDE, PRESERVATIVE FREE 10 ML: 5 INJECTION INTRAVENOUS at 20:57

## 2020-08-06 RX ADMIN — ENOXAPARIN SODIUM 30 MG: 40 INJECTION SUBCUTANEOUS at 08:47

## 2020-08-06 RX ADMIN — GUAIFENESIN AND DEXTROMETHORPHAN HYDROBROMIDE 1 TABLET: 600; 30 TABLET, EXTENDED RELEASE ORAL at 08:46

## 2020-08-06 RX ADMIN — LAMOTRIGINE 150 MG: 100 TABLET ORAL at 20:51

## 2020-08-06 ASSESSMENT — PAIN SCALES - GENERAL
PAINLEVEL_OUTOF10: 0

## 2020-08-06 NOTE — CARE COORDINATION
INITIAL CASE MANAGEMENT ASSESSMENT    Reviewed chart, met with patient to assess possible discharge needs. Explained Case Management role/services. Living Situation: confirmed, lives with wife, 1 CHA     ADLs: independent      DME: none    PT/OT Recs: not ordered     Active Services: none      Transportation: active , wife to transport at discharge     Medications: [employer] on [address], no issues obtain    PCP: henny Mei      HD/PD: n/a    PLAN/COMMENTS: no needs identified at this time, plan to return home with wife     SW/CM provided contact information for patient or family to call with any questions. SW/CM will follow and assist as needed.     Electronically signed by CHASIDY Perdue LSW on 8/6/2020 at 3:19 PM

## 2020-08-06 NOTE — PLAN OF CARE
Problem: Gas Exchange - Impaired  Goal: Absence of hypoxia  Outcome: Ongoing     Problem: Gas Exchange - Impaired  Goal: Promote optimal lung function  Outcome: Ongoing     Problem: Isolation Precautions - Risk of Spread of Infection  Goal: Prevent transmission of infection  Outcome: Ongoing     Problem: Falls - Risk of:  Goal: Will remain free from falls  Description: Will remain free from falls  Outcome: Ongoing   Fall risk assessment completed every shift. All precautions in place. Pt has call light within reach at all times. Room clear of clutter.

## 2020-08-06 NOTE — ACP (ADVANCE CARE PLANNING)
Advance Care Planning     Advance Care Planning Activator (Inpatient)  Conversation Note      Date of ACP Conversation: 8/4/2020    Conversation Conducted with: Patient with Decision Making Capacity    ACP Activator: [address] Maker:     Current Designated Health Care Decision Maker:     If no Decision Maker listed above or available through scanned documents, then:    If no Authorized Decision Maker has previously been identified, then patient chooses Health Care Decision Maker:  \"Who would you like to name as your primary health care decision-maker? \"               Name: Zuly Bocanegra      Relationship: Spouse          Phone number: [phone]  Robert Ornelas this person be reached easily? \" Yes    Care Preferences    Ventilation: \"If you were in your present state of health and suddenly became very ill and were unable to breathe on your own, what would your preference be about the use of a ventilator (breathing machine) if it were available to you? \"      Would the patient desire the use of ventilator (breathing machine)?: no    \"If your health worsens and it becomes clear that your chance of recovery is unlikely, what would your preference be about the use of a ventilator (breathing machine) if it were available to you? \"     Would the patient desire the use of ventilator (breathing machine)?: No      Resuscitation  \"CPR works best to restart the heart when there is a sudden event, like a heart attack, in someone who is otherwise healthy. Unfortunately, CPR does not typically restart the heart for people who have serious health conditions or who are very sick. \"    \"In the event your heart stopped as a result of an underlying serious health condition, would you want attempts to be made to restart your heart (answer \"yes\" for attempt to resuscitate) or would you prefer a natural death (answer \"no\" for do not attempt to resuscitate)? \" yes      NOTE: If the patient has a valid advance directive AND now provides care preference(s) that are inconsistent with that prior directive, advise the patient to consider either: creating a new advance directive that complies with state-specific requirements; or, if that is not possible, orally revoking that prior directive in accordance with state-specific requirements, which must be documented in the EHR. [] Yes   [x] No   Educated Patient / Era Moots regarding differences between Advance Directives and portable DNR orders. Length of ACP Conversation in minutes:      Conversation Outcomes:  [] ACP discussion completed  [] Existing advance directive reviewed with patient; no changes to patient's previously recorded wishes  [] New Advance Directive completed  [] Portable Do Not Rescitate prepared for Provider review and signature  [] POLST/POST/MOLST/MOST prepared for Provider review and signature      Follow-up plan:    [] Schedule follow-up conversation to continue planning  [x] Referred individual to Provider for additional questions/concerns   [] Advised patient/agent/surrogate to review completed ACP document and update if needed with changes in condition, patient preferences or care setting    [x] This note routed to one or more involved healthcare providers     Sw sent Genevave to attending  [facility] to continue ACP ventilation discussion.       Electronically signed by CHASIDY Purdy, HAKAN on 8/6/2020 at 3:23 PM

## 2020-08-06 NOTE — PROGRESS NOTES
LATE ENTRY    Spoke with Kolby Del Angel (Pharmacist) regarding the patient's scheduled remdesivir (see MAR). Kolby Del Angel sent the FDA Patient Fact Sheet to 5N for the patient to read and decide if he was willing to receive the medication. The patient read and verbalized understanding of the potential risks, benefits, and side effects, and said he was \"willing to try anything that will help. \" He spoke with his wife (emergency contact listed), who had a few questions regarding the medication so I educated her and also gave her the number for the infectious disease Dr. Allen Harrison). The patient's wife was able to speak with Dr. Wallace Graham on the phone and she told the patient that Dr. Wallace Graham told her he would be by to speak with the patient (no time specified). The patient then said he would like to wait to start the medication until speaking with Dr. Wallace Graham. Night shift RN Mao notified.

## 2020-08-06 NOTE — PROGRESS NOTES
lamoTRIgine (LAMICTAL) 150 MG tablet Take 150 mg by mouth 2 times daily      QUEtiapine (SEROQUEL) 100 MG tablet Take 100 mg by mouth 2 times daily      aspirin 81 MG EC tablet Take 81 mg by mouth daily         Allergies:  Bee pollen    Immunizations : There is no immunization history on file for this patient. Social History:     Social History     Tobacco Use    Smoking status: Never Smoker    Smokeless tobacco: Never Used   Substance Use Topics    Alcohol use: Yes     Comment: occ    Drug use: No     Social History     Tobacco Use   Smoking Status Never Smoker   Smokeless Tobacco Never Used      Family History   Problem Relation Age of Onset    Heart Disease Mother     Stroke Mother     Cancer Father         STOMACH      REVIEW OF SYSTEMS:    No fever / chills / sweats. No weight loss. No visual change, eye pain, eye discharge. No oral lesion, sore throat, dysphagia. Denies cough / sputum/Sob   Denies chest pain, palpitations/ dizziness  Denies nausea/ vomiting/abdominal pain/diarrhea. Denies dysuria or change in urinary function. Denies joint swelling or pain. No myalgia, arthralgia. No rashes, skin lesions   Denies focal weakness, sensory change or other neurologic symptoms  No lymph node swelling or tenderness. Fevers, sob+     PHYSICAL EXAM:      Vitals:    /64   Pulse 76   Temp 98.3 °F (36.8 °C) (Oral)   Resp 18   Ht 6' 4\" (1.93 m)   Wt 237 lb 3.4 oz (107.6 kg)   SpO2 91%   BMI 28.87 kg/m²     In-person bedside physical examination deferred.   Pursuant to the emergency declaration under the [facility], [facility] waiver authority and the [employer] Act, this clinical encounter was conducted to provide necessary medical care.   (Also consistent with new provisions and guidance offered by [employer] on March 18, 2020 in setting of COVID 19 outbreak and in order to preserve personal protective equipment in accordance with the flexibilities announced by CMS on March 30, 2020)   References: https://[facility]. [facility]/Portals/0/Resources/COVID-19/3_18%20Telemed%20Guidance%20Updated%20March%2018. pdf?[email or web address]                      https://[facility]. [facility]/Portals/0/Resources/COVID-19/3_18%20Telemed%20Guidance%20Updated%20March%2018. pdf?ulm=[email or web address]                      http://[employer] pdf                                  Pulmonary: deferred  Abdomen/GI: deferred  Neuro: deferred  Skin: deferred  Musculoskeletal:  deferred  Genitourinary: Deferred  Psych: deferred  Lymphatic/Immunologic: deferred  Data Review:    Lab Results   Component Value Date    WBC 9.2 08/06/2020    HGB 14.2 08/06/2020    HCT 41.0 08/06/2020    MCV 84.7 08/06/2020     08/06/2020     Lab Results   Component Value Date    CREATININE 0.7 (L) 08/06/2020    BUN 28 (H) 08/06/2020     08/06/2020    K 3.9 08/06/2020     08/06/2020    CO2 26 08/06/2020       Hepatic Function Panel:   Lab Results   Component Value Date    ALKPHOS 106 08/06/2020     08/06/2020     08/06/2020    PROT 5.6 08/06/2020    BILITOT 0.6 08/06/2020    LABALBU 3.0 08/06/2020       UA:  Lab Results   Component Value Date    COLORU YELLOW 08/19/2017    CLARITYU Clear 08/19/2017    GLUCOSEU Negative 08/19/2017    BILIRUBINUR Negative 08/19/2017    KETUA 40 08/19/2017    SPECGRAV >1.030 08/19/2017    BLOODU Negative 08/19/2017    PHUR 7.5 08/19/2017    PROTEINU Negative 08/19/2017    UROBILINOGEN 1.0 08/19/2017    NITRU Negative 08/19/2017    LEUKOCYTESUR Negative 08/19/2017    LABMICR Not Indicated 08/19/2017    URINETYPE Not Specified 08/19/2017      Urine Microscopic: No results found for: LABCAST, BACTERIA, COMU, HYALCAST, WBCUA, RBCUA, EPIU     Lactic acid  1.5      Ferritin  3295  Up 5636        D Dimer   345     AST  140  UP  223   ALT  189  UP 209       crp  50.5       MICRO: cultures reviewed and updated by me            Culture, Blood 2 [[id number]]   Collected: 08/04/20 1418    Order Status: Completed  Specimen: Blood  Updated: 08/05/20 1515     Culture, Blood 2  No Growth to date.  Any change in status will be called. Narrative:      ORDER#: [id number]                          ORDERED BY: Karl Mosquera   SOURCE: Blood                              COLLECTED:  08/04/20 14:18   ANTIBIOTICS AT [facility] :                      RECEIVED :  08/04/20 14:28   If child <=2 yrs old please draw pediatric bottle. ~Blood Culture #2   Performed at:   [facility]   [address], [place] [employer] [address]   Phone [phone]    Culture, Blood 1 [[id number]]   Collected: 08/04/20 1418    Order Status: Completed  Specimen: Blood  Updated: 08/05/20 1515     Blood Culture, Routine  No Growth to date.  Any change in status will be called. Narrative:      ORDER#: [id number]                          ORDERED BY: Karl Mosquera   SOURCE: Blood                              COLLECTED:  08/04/20 14:18   ANTIBIOTICS AT [facility] :                      RECEIVED :  08/04/20 14:28   If child <=2 yrs old please draw pediatric bottle. ~Blood Culture #1   Performed at:   [facility]   [address] [employer] [address]   Phone [phone]          IMAGING:    XR CHEST 1 VIEW   Final Result   Multifocal curvilinear and hazy opacities within the bilateral mid and lower   lung zones, most consistent with multifocal pneumonia, to include atypical   causes.                All the pertinent images and reports for the current Hospitalization were reviewed by me     Scheduled Meds:   [Held by provider] remdesivir IVPB  100 mg Intravenous Q24H    QUEtiapine  100 mg Oral BID    [Held by provider] pravastatin  40 mg Oral Nightly    lisinopril  20 mg Oral Daily    lamoTRIgine  150 mg Oral BID    aspirin  81 mg Complications/Morbidity: High      · Illness(es)/ Infection present that pose threat to bodily function. · There is potential for severe exacerbation of infection/side effects of treatment. · Therapy requires intensive monitoring for antimicrobial agent toxicity      Discussed with patient/Family and Nursing staff     Thanks for allowing me to participate in your patient's care and please call me with any questions or concerns.     Fatoumata Messer MD  Infectious Disease  [facility] [place] [facility] Physician  Phone: [phone]   Fax : [phone]

## 2020-08-06 NOTE — PROGRESS NOTES
Hospitalist Progress Note      PCP: Jeremy Mojica    Date of Admission: 8/4/2020    Chief Complaint: SOB    Hospital Course: Patient is a 71-year-old man with history of hypertension and hyperlipidemia who presents to the emergency room for evaluation of cough and shortness of breath. He states that he was diagnosed with COVID-19 is an outpatient on or about 7/27/2020. Approximately three weeks ago he was on vacation with 25 other people at the [facility] in [place]. Since that time several of his companions have been diagnosed with COVID-19. Over the past several days here developed a cough and worsening shortness of breath. In the emergency room he was found to have an oxygen saturation in the mid 80s on room air. He is being admitted for further evaluation and treatment. Associated signs and symptoms do not include fever or chills, nausea, vomiting, abdominal pain, hemoptysis, hematochezia, diarrhea, constipation or urinary symptoms. 8/5 Patient states his breathing is much better today than it was yesterday. He was started on Decadron and Lovenox twice daily. I have asked infectious disease to stop by to start antiviral therapy. Subjective: Patient seen and examined. Overnight patient's LFTs continue to trend up. They were elevated on admission and have continued to trend up before antiviral therapy was started. I believe this is secondary to COVID-19 and less likely to be the antiviral.  We will hold the remdesivir and start convalescent plasma.   We can hopefully restart therapy once his LFTs trend down      Medications:  Reviewed    Infusion Medications   Scheduled Medications    [Held by provider] remdesivir IVPB  100 mg Intravenous Q24H    QUEtiapine  100 mg Oral BID    [Held by provider] pravastatin  40 mg Oral Nightly    lisinopril  20 mg Oral Daily    lamoTRIgine  150 mg Oral BID    aspirin  81 mg Oral Daily    amLODIPine  5 mg Oral Daily    sodium chloride flush  10 mL Intravenous 2 times per day    enoxaparin  30 mg Subcutaneous BID    dexamethasone  6 mg Oral Daily    potassium chloride  40 mEq Oral Once     PRN Meds: dextromethorphan-guaiFENesin, sodium chloride flush, acetaminophen **OR** acetaminophen, polyethylene glycol, ondansetron, albuterol sulfate HFA, benzonatate      Intake/Output Summary (Last 24 hours) at 8/6/2020 1606  Last data filed at 8/6/2020 1456  Gross per 24 hour   Intake 966 ml   Output 0 ml   Net 966 ml       Physical Exam Performed:    /74   Pulse 91   Temp 98.1 °F (36.7 °C) (Oral)   Resp 18   Ht 6' 4\" (1.93 m)   Wt 237 lb 3.4 oz (107.6 kg)   SpO2 90%   BMI 28.87 kg/m²     General appearance: No apparent distress, appears stated age and cooperative. HEENT: Pupils equal, round, and reactive to light. Conjunctivae/corneas clear. Neck: Supple, with full range of motion. No jugular venous distention. Trachea midline. Respiratory:  Normal respiratory effort. Clear to auscultation, bilaterally without Rales/Wheezes/Rhonchi. Cardiovascular: Regular rate and rhythm with normal S1/S2 without murmurs, rubs or gallops. Abdomen: Soft, non-tender, non-distended with normal bowel sounds. Musculoskeletal: No clubbing, cyanosis or edema bilaterally. Full range of motion without deformity. Skin: Skin color, texture, turgor normal.  No rashes or lesions. Neurologic:  Neurovascularly intact without any focal sensory/motor deficits.  Cranial nerves: II-XII intact, grossly non-focal.  Psychiatric: Alert and oriented, thought content appropriate, normal insight  Capillary Refill: Brisk,< 3 seconds   Peripheral Pulses: +2 palpable, equal bilaterally       Labs:   Recent Labs     08/04/20  1418 08/05/20  0534 08/06/20  0523   WBC 6.4 5.5 9.2   HGB 14.8 14.3 14.2   HCT 43.6 41.0 41.0   * 146 176     Recent Labs     08/04/20  1418 08/05/20  0534 08/06/20  0523     141 139 142   K 3.3*  3.2* 4.0 3.9     103 102 104   CO2 23  23 25 26 BUN 17  18 19 28*   CREATININE 1.1  1.0 0.8 0.7*   CALCIUM 8.1*  8.2* 8.1* 7.8*     Recent Labs     08/04/20  1418 08/05/20  0534 08/06/20  0523   *  138* 189* 209*   ALT 96*  93* 140* 223*   BILITOT 0.8  0.8 0.5 0.6   ALKPHOS 107  103 115 106     Recent Labs     08/05/20  0534 08/06/20  0523   INR 1.02 0.89     Recent Labs     08/04/20  1418   TROPONINI <0.01       Urinalysis:      Lab Results   Component Value Date    NITRU Negative 08/19/2017    BLOODU Negative 08/19/2017    SPECGRAV >1.030 08/19/2017    GLUCOSEU Negative 08/19/2017       Radiology:  XR CHEST 1 VIEW   Final Result   Multifocal curvilinear and hazy opacities within the bilateral mid and lower   lung zones, most consistent with multifocal pneumonia, to include atypical   causes. Assessment/Plan:    COVID-19 pneumonia  Decadron 6 mg daily for 10 days  Lovenox twice daily  Infectious disease consult for remdesivir, started on August 5 but stopped on August 6 due to elevated LFTs  convalescent plasma ordered  We will hold on Actemra due to recent studies    Acute respiratory failure with hypoxia  Secondary to COVID-19 pneumonia  Continue to titrate oxygen to keep saturations above 90%  Staying stable at 7L     Essential (primary) hypertension - continue home meds and monitor blood pressure     Hyperlipidemia - No current evidence of Rhabdomyolysis or other adverse effects.  Continue statin therapy while in the hospital    DVT Prophylaxis: Lovenox  Diet: DIET GENERAL;  Dietary Nutrition Supplements: Standard High Calorie Oral Supplement  Code Status: Full Code    PT/OT Eval Status: Not applicable    Dispo -inpatient    Pritesh Hilario MD

## 2020-08-07 LAB
A/G RATIO: 1.3 (ref 1.1–2.2)
ALBUMIN SERPL-MCNC: 3 G/DL (ref 3.4–5)
ALP BLD-CCNC: 92 U/L (ref 40–129)
ALT SERPL-CCNC: 155 U/L (ref 10–40)
ANION GAP SERPL CALCULATED.3IONS-SCNC: 10 MMOL/L (ref 3–16)
AST SERPL-CCNC: 65 U/L (ref 15–37)
BASOPHILS ABSOLUTE: 0 K/UL (ref 0–0.2)
BASOPHILS RELATIVE PERCENT: 0.2 %
BILIRUB SERPL-MCNC: 0.5 MG/DL (ref 0–1)
BLOOD BANK DISPENSE STATUS: NORMAL
BLOOD BANK PRODUCT CODE: NORMAL
BPU ID: NORMAL
BUN BLDV-MCNC: 31 MG/DL (ref 7–20)
C-REACTIVE PROTEIN: 17.8 MG/L (ref 0–5.1)
CALCIUM SERPL-MCNC: 7.6 MG/DL (ref 8.3–10.6)
CHLORIDE BLD-SCNC: 104 MMOL/L (ref 99–110)
CO2: 26 MMOL/L (ref 21–32)
CREAT SERPL-MCNC: 0.8 MG/DL (ref 0.8–1.3)
D DIMER: 279 NG/ML DDU (ref 0–229)
DESCRIPTION BLOOD BANK: NORMAL
EOSINOPHILS ABSOLUTE: 0 K/UL (ref 0–0.6)
EOSINOPHILS RELATIVE PERCENT: 0 %
FERRITIN: 2925 NG/ML (ref 30–400)
GFR AFRICAN AMERICAN: >60
GFR NON-AFRICAN AMERICAN: >60
GLOBULIN: 2.3 G/DL
GLUCOSE BLD-MCNC: 206 MG/DL (ref 70–99)
HCT VFR BLD CALC: 39.9 % (ref 40.5–52.5)
HEMOGLOBIN: 13.8 G/DL (ref 13.5–17.5)
LYMPHOCYTES ABSOLUTE: 0.6 K/UL (ref 1–5.1)
LYMPHOCYTES RELATIVE PERCENT: 8.1 %
MCH RBC QN AUTO: 29.2 PG (ref 26–34)
MCHC RBC AUTO-ENTMCNC: 34.6 G/DL (ref 31–36)
MCV RBC AUTO: 84.3 FL (ref 80–100)
MONOCYTES ABSOLUTE: 0.5 K/UL (ref 0–1.3)
MONOCYTES RELATIVE PERCENT: 7 %
NEUTROPHILS ABSOLUTE: 6.6 K/UL (ref 1.7–7.7)
NEUTROPHILS RELATIVE PERCENT: 84.7 %
PDW BLD-RTO: 13 % (ref 12.4–15.4)
PLATELET # BLD: 207 K/UL (ref 135–450)
PMV BLD AUTO: 7 FL (ref 5–10.5)
POTASSIUM REFLEX MAGNESIUM: 4.1 MMOL/L (ref 3.5–5.1)
RBC # BLD: 4.74 M/UL (ref 4.2–5.9)
SODIUM BLD-SCNC: 140 MMOL/L (ref 136–145)
TOTAL PROTEIN: 5.3 G/DL (ref 6.4–8.2)
WBC # BLD: 7.8 K/UL (ref 4–11)

## 2020-08-07 PROCEDURE — 6360000002 HC RX W HCPCS: Performed by: INTERNAL MEDICINE

## 2020-08-07 PROCEDURE — 6370000000 HC RX 637 (ALT 250 FOR IP): Performed by: INTERNAL MEDICINE

## 2020-08-07 PROCEDURE — 2060000000 HC ICU INTERMEDIATE R&B

## 2020-08-07 PROCEDURE — 85379 FIBRIN DEGRADATION QUANT: CPT

## 2020-08-07 PROCEDURE — 36415 COLL VENOUS BLD VENIPUNCTURE: CPT

## 2020-08-07 PROCEDURE — 2580000003 HC RX 258: Performed by: INTERNAL MEDICINE

## 2020-08-07 PROCEDURE — 30233L1 TRANSFUSION OF NONAUTOLOGOUS FRESH PLASMA INTO PERIPHERAL VEIN, PERCUTANEOUS APPROACH: ICD-10-PCS | Performed by: INTERNAL MEDICINE

## 2020-08-07 PROCEDURE — 2700000000 HC OXYGEN THERAPY PER DAY

## 2020-08-07 PROCEDURE — 94761 N-INVAS EAR/PLS OXIMETRY MLT: CPT

## 2020-08-07 PROCEDURE — 86140 C-REACTIVE PROTEIN: CPT

## 2020-08-07 PROCEDURE — 85025 COMPLETE CBC W/AUTO DIFF WBC: CPT

## 2020-08-07 PROCEDURE — 80053 COMPREHEN METABOLIC PANEL: CPT

## 2020-08-07 PROCEDURE — 99233 SBSQ HOSP IP/OBS HIGH 50: CPT | Performed by: INTERNAL MEDICINE

## 2020-08-07 PROCEDURE — 82728 ASSAY OF FERRITIN: CPT

## 2020-08-07 RX ADMIN — QUETIAPINE FUMARATE 100 MG: 100 TABLET ORAL at 08:11

## 2020-08-07 RX ADMIN — LISINOPRIL 20 MG: 20 TABLET ORAL at 08:11

## 2020-08-07 RX ADMIN — LAMOTRIGINE 150 MG: 100 TABLET ORAL at 21:11

## 2020-08-07 RX ADMIN — QUETIAPINE FUMARATE 100 MG: 100 TABLET ORAL at 21:11

## 2020-08-07 RX ADMIN — ENOXAPARIN SODIUM 30 MG: 40 INJECTION SUBCUTANEOUS at 08:11

## 2020-08-07 RX ADMIN — AMLODIPINE BESYLATE 5 MG: 5 TABLET ORAL at 08:11

## 2020-08-07 RX ADMIN — ENOXAPARIN SODIUM 30 MG: 40 INJECTION SUBCUTANEOUS at 21:11

## 2020-08-07 RX ADMIN — SODIUM CHLORIDE, PRESERVATIVE FREE 10 ML: 5 INJECTION INTRAVENOUS at 08:11

## 2020-08-07 RX ADMIN — LAMOTRIGINE 150 MG: 100 TABLET ORAL at 08:11

## 2020-08-07 RX ADMIN — DEXAMETHASONE 6 MG: 6 TABLET ORAL at 08:11

## 2020-08-07 RX ADMIN — SODIUM CHLORIDE, PRESERVATIVE FREE 10 ML: 5 INJECTION INTRAVENOUS at 21:11

## 2020-08-07 RX ADMIN — ASPIRIN 81 MG: 81 TABLET, COATED ORAL at 08:11

## 2020-08-07 ASSESSMENT — PAIN SCALES - GENERAL
PAINLEVEL_OUTOF10: 0

## 2020-08-07 NOTE — PROGRESS NOTES
Infectious Disease Follow up Notes  Admit Date: 8/4/2020  Hospital Day: 4    Antibiotics :   Dexamethasone  IV Remdesivir on hold due to Lft rise  S/p Convalescent plasma on 8/6     CHIEF COMPLAINT:     COVID-19 Pneumonia  Fevers  Sob    Subjective interval History :  60 y.o. man admitted on 8/4 with fevers, sob, cough was diagnosed with COVID -19 infection as  Outpatient and we cannot see the results in Epic or in care every where but apparently had other people with him tested positive for COVID-19 per HPI as pt was recently out on vacation. He has HTN, AND hyperlipidemia. Fevers at home now in hospital at 99.5 and  Elevation in Lfts, creat normal , WBC normal with Lymphopenia.  CXR with bi lateral PNA noted and he is on  9 lts nasal cannula as his Oxygen requirements are going up we are consulted for recommendations      Remains on 8lts nasal cannula and no fever Ferrritin elevated but some trend down and Lfts still elevated but better    Past Medical History:    Past Medical History:   Diagnosis Date    GERD (gastroesophageal reflux disease)     Hyperlipidemia     Hypertension     Kidney stone        Past Surgical History:    Past Surgical History:   Procedure Laterality Date    CHOLECYSTECTOMY  08/19/2017    Lap    COLONOSCOPY  10/16/2015    CYSTOSCOPY      KIDNEY STONES    ENDOSCOPY, COLON, DIAGNOSTIC  08/08/2017    UPPER GASTROINTESTINAL ENDOSCOPY  07/14/2017       Current Medications:    Outpatient Medications Marked as Taking for the 8/4/20 encounter [facility] Encounter)   Medication Sig Dispense Refill    lisinopril (PRINIVIL;ZESTRIL) 20 MG tablet Take 20 mg by mouth daily      amLODIPine (NORVASC) 5 MG tablet Take 5 mg by mouth daily      pravastatin (PRAVACHOL) 40 MG tablet Take 40 mg by mouth nightly       lamoTRIgine (LAMICTAL) 150 MG tablet Take 150 mg by mouth 2 times daily      QUEtiapine (SEROQUEL) 100 MG tablet Take 100 mg by mouth 2 times daily      aspirin 81 MG EC tablet Take 81 mg by mouth daily         Allergies:  Bee pollen    Immunizations : There is no immunization history on file for this patient. Social History:     Social History     Tobacco Use    Smoking status: Never Smoker    Smokeless tobacco: Never Used   Substance Use Topics    Alcohol use: Yes     Comment: occ    Drug use: No     Social History     Tobacco Use   Smoking Status Never Smoker   Smokeless Tobacco Never Used      Family History   Problem Relation Age of Onset    Heart Disease Mother     Stroke Mother     Cancer Father         STOMACH      REVIEW OF SYSTEMS:    No fever / chills / sweats. No weight loss. No visual change, eye pain, eye discharge. No oral lesion, sore throat, dysphagia. Denies cough / sputum/Sob   Denies chest pain, palpitations/ dizziness  Denies nausea/ vomiting/abdominal pain/diarrhea. Denies dysuria or change in urinary function. Denies joint swelling or pain. No myalgia, arthralgia. No rashes, skin lesions   Denies focal weakness, sensory change or other neurologic symptoms  No lymph node swelling or tenderness. Fevers, sob+     PHYSICAL EXAM:      Vitals:    /68   Pulse 69   Temp 98.5 °F (36.9 °C) (Oral)   Resp 16   Ht 6' 4\" (1.93 m)   Wt 236 lb 5.3 oz (107.2 kg)   SpO2 (!) 88%   BMI 28.77 kg/m²     In-person bedside physical examination deferred.   Pursuant to the emergency declaration under the [facility], [facility] waiver authority and the [employer] Act, this clinical encounter was conducted to provide necessary medical care.   (Also consistent with new provisions and guidance offered by [facility] on March 18, 2020 in setting of COVID 19 outbreak and in order to preserve personal protective equipment in accordance with the flexibilities announced by CMS on March 30, 2020) [[id number]]   Collected: 08/04/20 1418    Order Status: Completed  Specimen: Blood  Updated: 08/05/20 1515     Culture, Blood 2  No Growth to date.  Any change in status will be called. Narrative:      ORDER#: [id number]                          ORDERED BY: Candice Pisano   SOURCE: Blood                              COLLECTED:  08/04/20 14:18   ANTIBIOTICS AT [facility] :                      RECEIVED :  08/04/20 14:28   If child <=2 yrs old please draw pediatric bottle. ~Blood Culture #2   Performed at:   [facility]   [address] [place] [address]   Phone [phone]    Culture, Blood 1 [[id number]]   Collected: 08/04/20 1418    Order Status: Completed  Specimen: Blood  Updated: 08/05/20 1515     Blood Culture, Routine  No Growth to date.  Any change in status will be called. Narrative:      ORDER#: [id number]                          ORDERED BY: Candice Pisano   SOURCE: Blood                              COLLECTED:  08/04/20 14:18   ANTIBIOTICS AT [facility] :                      RECEIVED :  08/04/20 14:28   If child <=2 yrs old please draw pediatric bottle. ~Blood Culture #1   Performed at:   [facility]   [address] [place] [address]   Phone [phone]          IMAGING:    XR CHEST 1 VIEW   Final Result   Multifocal curvilinear and hazy opacities within the bilateral mid and lower   lung zones, most consistent with multifocal pneumonia, to include atypical   causes.                All the pertinent images and reports for the current Hospitalization were reviewed by me     Scheduled Meds:   [Held by provider] remdesivir IVPB  100 mg Intravenous Q24H    sodium chloride  20 mL Intravenous Once    QUEtiapine  100 mg Oral BID    [Held by provider] pravastatin  40 mg Oral Nightly    lisinopril  20 mg Oral Daily    lamoTRIgine  150 mg Oral BID    aspirin  81 mg Oral Daily    amLODIPine  5 mg Oral Daily    sodium chloride flush  10 mL Intravenous 2 times per day    enoxaparin  30 mg Subcutaneous BID    dexamethasone  6 mg Oral Daily    potassium chloride  40 mEq Oral Once       Continuous Infusions:      PRN Meds:  dextromethorphan-guaiFENesin, sodium chloride flush, acetaminophen **OR** acetaminophen, polyethylene glycol, ondansetron, albuterol sulfate HFA, benzonatate      Assessment:     Patient Active Problem List   Diagnosis    Cholecystitis    Tenosynovitis of left wrist    COVID-19 virus infection    Acute respiratory failure due to COVID-19    Essential hypertension    Hyperlipidemia     COVID19 infection   COVID-19 Pneumonia  WBC normal with Lymphopenia  Ferritin elevation  Hypoxia using 9 lts nasal cannula  HTN   Obesity +  Presentation consistent with COVID-19 PNA     Pt had out pt COVID-19 testing and we cannot locate the results not in Epic and not in care every where  Will initiate IV Remdesivir and creat stable will consider Plasma therapy as reports now indicate giving early is more beneficial than later      Lfts elevated and have been up even before Remdesivir so likely from COVID-19 infection will have to hold Remdesivir and pt has consented for plasma and  his Ferritin is up and elevated    S/p Convalescent plasma on 8/6 and still on 8lts Fio2 and would like Lfts to improve further before considering IV Remdesivir hopefully if resp status improves may not need any additional therapies      Labs, Microbiology, Radiology and all the pertinent results from current hospitalization and  care every where were reviewed  by me as a part of the evaluation   Plan:   1. Check COVID-19 rapid testing is positive +   2. Hold. IV Remdesivir due to elevated Lfts will consider starting once lFTS ARE better   3. Cont IV Dexamethasone   4. Type and screen done  5. S/p Convalescent plasma pt has consented and forms signed and infusion completed on  8/6  6.  Trend D dimer, ferritin, CRP      Discussed with patient/Family and Nursing d/w Pharmacy       Addendum: Pt now enrolled in [address] [place] [address] plasma program [address] [place] [address] protocol  Date  8/6/20    Patient Code :  684185      Risk of Complications/Morbidity: High      · Illness(es)/ Infection present that pose threat to bodily function. · There is potential for severe exacerbation of infection/side effects of treatment. · Therapy requires intensive monitoring for antimicrobial agent toxicity      Discussed with patient/Family and Nursing staff     Thanks for allowing me to participate in your patient's care and please call me with any questions or concerns.     Gavi Prescott MD  Infectious Disease  [facility] ([facility]) Physician  Phone: [phone]   Fax : [phone]

## 2020-08-07 NOTE — PROGRESS NOTES
4 Eyes Skin Assessment     The patient is being assess for  Transfer to New Unit    I agree that 2 RN's have performed a thorough Head to Toe Skin Assessment on the patient. ALL assessment sites listed below have been assessed. Areas assessed by both nurses: yes  [x]   Head, Face, and Ears   [x]   Shoulders, Back, and Chest  [x]   Arms, Elbows, and Hands   [x]   Coccyx, Sacrum, and IschIum  [x]   Legs, Feet, and Heels        Does the Patient have Skin Breakdown?   No         Rustam Prevention initiated:  No   Wound Care Orders initiated:  No      [facility] nurse consulted for Pressure Injury (Stage 3,4, Unstageable, DTI, NWPT, and Complex wounds), New and Established Ostomies:  No      Nurse 1 eSignature: Electronically signed by Steven Johnson RN on 8/7/20 at 5:02 PM EDT    **SHARE this note so that the co-signing nurse is able to place an eSignature**    Nurse 2 eSignature: Electronically signed by Winifred Schlatter, RN on 8/7/20 at 5:12 PM EDT

## 2020-08-07 NOTE — PROGRESS NOTES
Patient to room 4258 via wheelchair. Patient is alert and oriented and steady on his feet. Placed patient on 10L high flow nasal cannula. Oriented patient to room and call light system. Confirmed cardiac monitor with CMU. Patient expresses no needs at this time. Will continue to monitor.  Electronically signed by Roseanne Osborne RN on 8/7/2020 at 4:10 PM

## 2020-08-07 NOTE — PLAN OF CARE
Problem: Airway Clearance - Ineffective  Goal: Achieve or maintain patent airway  Outcome: Ongoing     Problem: Gas Exchange - Impaired  Goal: Absence of hypoxia  Outcome: Ongoing     Problem: Breathing Pattern - Ineffective  Goal: Ability to achieve and maintain a regular respiratory rate  Outcome: Ongoing     Problem: Risk for Fluid Volume Deficit  Goal: Maintain normal heart rhythm  Outcome: Ongoing

## 2020-08-07 NOTE — PLAN OF CARE
Problem: Airway Clearance - Ineffective  Goal: Achieve or maintain patent airway  8/7/2020 1703 by Alexandra Price RN  Outcome: Ongoing  8/7/2020 1332 by Valerie Yan RN  Outcome: Ongoing     Problem: Gas Exchange - Impaired  Goal: Absence of hypoxia  8/7/2020 1703 by Alexandra Price RN  Outcome: Ongoing  8/7/2020 1332 by Valerie Yan RN  Outcome: Ongoing  Goal: Promote optimal lung function  8/7/2020 1703 by Alexandra Price RN  Outcome: Ongoing  8/7/2020 1332 by Valerie Yan RN  Outcome: Ongoing     Problem: Breathing Pattern - Ineffective  Goal: Ability to achieve and maintain a regular respiratory rate  8/7/2020 1703 by Alexandra Price RN  Outcome: Ongoing  8/7/2020 1332 by Valerie Yan RN  Outcome: Ongoing     Problem:  Body Temperature -  Risk of, Imbalanced  Goal: Ability to maintain a body temperature within defined limits  8/7/2020 1703 by Alexandra Price RN  Outcome: Ongoing  8/7/2020 1332 by Valerie Yan RN  Outcome: Ongoing  Goal: Will regain or maintain usual level of consciousness  8/7/2020 1703 by Alexandra Price RN  Outcome: Ongoing  8/7/2020 1332 by Valerie Yna RN  Outcome: Ongoing  Goal: Complications related to the disease process, condition or treatment will be avoided or minimized  8/7/2020 1703 by Alexandra Price RN  Outcome: Ongoing  8/7/2020 1332 by Valerie Yan RN  Outcome: Ongoing     Problem: Isolation Precautions - Risk of Spread of Infection  Goal: Prevent transmission of infection  8/7/2020 1703 by Alexandra Price RN  Outcome: Ongoing  8/7/2020 1332 by Valerie Yan RN  Outcome: Ongoing     Problem: Nutrition Deficits  Goal: Optimize nutrtional status  8/7/2020 1703 by Alexandra Price RN  Outcome: Ongoing  8/7/2020 1332 by Valerie Yan RN  Outcome: Ongoing     Problem: Risk for Fluid Volume Deficit  Goal: Maintain normal heart rhythm  8/7/2020 1703 by Alexandra Price RN  Outcome: Ongoing  8/7/2020 1332 by Valerie Yan RN  Outcome: Ongoing  Goal: Maintain absence of muscle cramping  8/7/2020 1703 by Kareem Newton RN  Outcome: Ongoing  8/7/2020 1332 by Solitario Osorio RN  Outcome: Ongoing  Goal: Maintain normal serum potassium, sodium, calcium, phosphorus, and pH  8/7/2020 1703 by Kareem Newton RN  Outcome: Ongoing  8/7/2020 1332 by Solitario Osorio RN  Outcome: Ongoing     Problem: Loneliness or Risk for Loneliness  Goal: Demonstrate positive use of time alone when socialization is not possible  8/7/2020 1703 by Kareem Newton RN  Outcome: Ongoing  8/7/2020 1332 by Solitario Osorio RN  Outcome: Ongoing     Problem: Fatigue  Goal: Verbalize increase energy and improved vitality  8/7/2020 1703 by Kareem Newton RN  Outcome: Ongoing  8/7/2020 1332 by Solitario Osroio RN  Outcome: Ongoing     Problem: Patient Education: Go to Patient Education Activity  Goal: Patient/Family Education  8/7/2020 1703 by Kareem Newton RN  Outcome: Ongoing  8/7/2020 1332 by Solitario Osorio RN  Outcome: Ongoing     Problem: Falls - Risk of:  Goal: Will remain free from falls  Description: Will remain free from falls  8/7/2020 1703 by Kareem Newton RN  Outcome: Ongoing  8/7/2020 1332 by Solitario Osorio RN  Outcome: Ongoing  Goal: Absence of physical injury  Description: Absence of physical injury  8/7/2020 1703 by Kareem Newton RN  Outcome: Ongoing  8/7/2020 1332 by Solitario Osorio RN  Outcome: Ongoing     Problem: Nutrition  Goal: Optimal nutrition therapy  8/7/2020 1703 by Kareem Newton RN  Outcome: Ongoing  8/7/2020 1332 by Solitario Osorio RN  Outcome: Ongoing

## 2020-08-07 NOTE — PLAN OF CARE
Problem: Airway Clearance - Ineffective  Goal: Achieve or maintain patent airway  8/7/2020 1332 by Ileana Welch RN  Outcome: Ongoing     Problem: Gas Exchange - Impaired  Goal: Absence of hypoxia  8/7/2020 1332 by Ileana Welch RN  Outcome: Ongoing     Problem: Gas Exchange - Impaired  Goal: Promote optimal lung function  8/7/2020 1332 by Ileana Welch RN  Outcome: Ongoing     Problem: Breathing Pattern - Ineffective  Goal: Ability to achieve and maintain a regular respiratory rate  8/7/2020 1332 by Ileana Welch RN  Outcome: Ongoing     Problem: Body Temperature -  Risk of, Imbalanced  Goal: Ability to maintain a body temperature within defined limits  8/7/2020 1332 by Ileana Welch RN  Outcome: Ongoing     Problem: Body Temperature -  Risk of, Imbalanced  Goal: Will regain or maintain usual level of consciousness  8/7/2020 1332 by Ileana Welch RN  Outcome: Ongoing     Problem:  Body Temperature -  Risk of, Imbalanced  Goal: Complications related to the disease process, condition or treatment will be avoided or minimized  8/7/2020 1332 by Ileana Welch RN  Outcome: Ongoing     Problem: Isolation Precautions - Risk of Spread of Infection  Goal: Prevent transmission of infection  8/7/2020 1332 by Ileana Welch RN  Outcome: Ongoing     Problem: Nutrition Deficits  Goal: Optimize nutrtional status  8/7/2020 1332 by Ileana Welch RN  Outcome: Ongoing     Problem: Risk for Fluid Volume Deficit  Goal: Maintain normal heart rhythm  8/7/2020 1332 by Ileana Welch RN  Outcome: Ongoing     Problem: Risk for Fluid Volume Deficit  Goal: Maintain absence of muscle cramping  8/7/2020 1332 by Ileana Welch RN  Outcome: Ongoing     Problem: Risk for Fluid Volume Deficit  Goal: Maintain normal serum potassium, sodium, calcium, phosphorus, and pH  8/7/2020 1332 by Ileana Welch RN  Outcome: Ongoing     Problem: Loneliness or Risk for Loneliness  Goal: Demonstrate positive use of time alone when socialization is not possible  8/7/2020 1332 by Swathi Teresa RN  Outcome: Ongoing     Problem: Fatigue  Goal: Verbalize increase energy and improved vitality  8/7/2020 1332 by Swathi Teresa RN  Outcome: Ongoing     Problem: Patient Education: Go to Patient Education Activity  Goal: Patient/Family Education  8/7/2020 1332 by Swathi Teresa RN  Outcome: Ongoing     Problem: Falls - Risk of:  Goal: Will remain free from falls  Description: Will remain free from falls  8/7/2020 1332 by Swathi Teresa RN  Outcome: Ongoing     Problem: Falls - Risk of:  Goal: Absence of physical injury  Description: Absence of physical injury  8/7/2020 1332 by Swathi Teresa RN  Outcome: Ongoing     Problem: Nutrition  Goal: Optimal nutrition therapy  8/7/2020 1332 by Swathi Teresa RN  Outcome: Ongoing

## 2020-08-07 NOTE — PROGRESS NOTES
Hospitalist Progress Note      PCP: Indra Morrison    Date of Admission: 8/4/2020    Chief Complaint: SOB    Hospital Course: Patient is a 81-year-old man with history of hypertension and hyperlipidemia who presents to the emergency room for evaluation of cough and shortness of breath. He states that he was diagnosed with COVID-19 is an outpatient on or about 7/27/2020. Approximately three weeks ago he was on vacation with 25 other people at the [facility] in [place]. Since that time several of his companions have been diagnosed with COVID-19. Over the past several days here developed a cough and worsening shortness of breath. In the emergency room he was found to have an oxygen saturation in the mid 80s on room air. He is being admitted for further evaluation and treatment. Associated signs and symptoms do not include fever or chills, nausea, vomiting, abdominal pain, hemoptysis, hematochezia, diarrhea, constipation or urinary symptoms. 8/5 Patient states his breathing is much better today than it was yesterday. He was started on Decadron and Lovenox twice daily. I have asked infectious disease to stop by to start antiviral therapy. 8/6 Overnight patient's LFTs continue to trend up. They were elevated on admission and have continued to trend up before antiviral therapy was started. I believe this is secondary to COVID-19 and less likely to be the antiviral.  We will hold the remdesivir and start convalescent plasma. We can hopefully restart therapy once his LFTs trend down      Subjective: Patient seen and examined. Patient sleeping in bed. Still requiring 8L oxygen.  Will talk with ID about restarting remdesivir with improvement in LFT    Medications:  Reviewed    Infusion Medications   Scheduled Medications    [Held by provider] remdesivir IVPB  100 mg Intravenous Q24H    sodium chloride  20 mL Intravenous Once    QUEtiapine  100 mg Oral BID    [Held by provider] pravastatin  40 mg Oral Nightly    lisinopril  20 mg Oral Daily    lamoTRIgine  150 mg Oral BID    aspirin  81 mg Oral Daily    amLODIPine  5 mg Oral Daily    sodium chloride flush  10 mL Intravenous 2 times per day    enoxaparin  30 mg Subcutaneous BID    dexamethasone  6 mg Oral Daily    potassium chloride  40 mEq Oral Once     PRN Meds: dextromethorphan-guaiFENesin, sodium chloride flush, acetaminophen **OR** acetaminophen, polyethylene glycol, ondansetron, albuterol sulfate HFA, benzonatate      Intake/Output Summary (Last 24 hours) at 8/7/2020 0853  Last data filed at 8/7/2020 0150  Gross per 24 hour   Intake 1533 ml   Output 0 ml   Net 1533 ml       Physical Exam Performed:    /68   Pulse 69   Temp 98.5 °F (36.9 °C) (Oral)   Resp 16   Ht 6' 4\" (1.93 m)   Wt 236 lb 5.3 oz (107.2 kg)   SpO2 91%   BMI 28.77 kg/m²     General appearance: No apparent distress, appears stated age and cooperative. HEENT: Pupils equal, round, and reactive to light. Conjunctivae/corneas clear. Neck: Supple, with full range of motion. No jugular venous distention. Trachea midline. Respiratory:  Normal respiratory effort. Clear to auscultation, bilaterally without Rales/Wheezes/Rhonchi. Cardiovascular: Regular rate and rhythm with normal S1/S2 without murmurs, rubs or gallops. Abdomen: Soft, non-tender, non-distended with normal bowel sounds. Musculoskeletal: No clubbing, cyanosis or edema bilaterally. Full range of motion without deformity. Skin: Skin color, texture, turgor normal.  No rashes or lesions. Neurologic:  Neurovascularly intact without any focal sensory/motor deficits.  Cranial nerves: II-XII intact, grossly non-focal.  Psychiatric: Alert and oriented, thought content appropriate, normal insight  Capillary Refill: Brisk,< 3 seconds   Peripheral Pulses: +2 palpable, equal bilaterally       Labs:   Recent Labs     08/05/20  0534 08/06/20  0523 08/07/20  0455   WBC 5.5 9.2 7.8   HGB 14.3 14.2 13.8   HCT 41.0 41.0 39.9*    176 207     Recent Labs     08/05/20  0534 08/06/20  0523 08/07/20  0455    142 140   K 4.0 3.9 4.1    104 104   CO2 25 26 26   BUN 19 28* 31*   CREATININE 0.8 0.7* 0.8   CALCIUM 8.1* 7.8* 7.6*     Recent Labs     08/05/20  0534 08/06/20  0523 08/07/20  0455   * 209* 65*   * 223* 155*   BILITOT 0.5 0.6 0.5   ALKPHOS 115 106 92     Recent Labs     08/05/20  0534 08/06/20  0523   INR 1.02 0.89     Recent Labs     08/04/20  1418   TROPONINI <0.01       Urinalysis:      Lab Results   Component Value Date    NITRU Negative 08/19/2017    BLOODU Negative 08/19/2017    SPECGRAV >1.030 08/19/2017    GLUCOSEU Negative 08/19/2017       Radiology:  XR CHEST 1 VIEW   Final Result   Multifocal curvilinear and hazy opacities within the bilateral mid and lower   lung zones, most consistent with multifocal pneumonia, to include atypical   causes. Assessment/Plan:    COVID-19 pneumonia  Decadron 6 mg daily for 10 days  Lovenox twice daily  Infectious disease consult for remdesivir, started on August 5 but stopped on August 6 due to elevated LFTs, will ask to restart with improved LFT  convalescent plasma 8/7  We will hold on Actemra due to recent studies    Acute respiratory failure with hypoxia  Secondary to COVID-19 pneumonia  Continue to titrate oxygen to keep saturations above 90%  Up to 10L     Essential (primary) hypertension - continue home meds and monitor blood pressure     Hyperlipidemia - No current evidence of Rhabdomyolysis or other adverse effects.  Continue statin therapy while in the hospital    DVT Prophylaxis: Lovenox  Diet: DIET GENERAL;  Dietary Nutrition Supplements: Standard High Calorie Oral Supplement  Code Status: Full Code    PT/OT Eval Status: Not applicable    Dispo -inpatient    Karin Rose MD

## 2020-08-08 LAB
A/G RATIO: 1.5 (ref 1.1–2.2)
ALBUMIN SERPL-MCNC: 3.2 G/DL (ref 3.4–5)
ALP BLD-CCNC: 93 U/L (ref 40–129)
ALT SERPL-CCNC: 175 U/L (ref 10–40)
ANION GAP SERPL CALCULATED.3IONS-SCNC: 7 MMOL/L (ref 3–16)
AST SERPL-CCNC: 73 U/L (ref 15–37)
BASOPHILS ABSOLUTE: 0 K/UL (ref 0–0.2)
BASOPHILS RELATIVE PERCENT: 0.2 %
BILIRUB SERPL-MCNC: 0.5 MG/DL (ref 0–1)
BLOOD CULTURE, ROUTINE: NORMAL
BUN BLDV-MCNC: 25 MG/DL (ref 7–20)
C-REACTIVE PROTEIN: 9.1 MG/L (ref 0–5.1)
CALCIUM SERPL-MCNC: 7.9 MG/DL (ref 8.3–10.6)
CHLORIDE BLD-SCNC: 107 MMOL/L (ref 99–110)
CO2: 28 MMOL/L (ref 21–32)
CREAT SERPL-MCNC: 0.6 MG/DL (ref 0.8–1.3)
CULTURE, BLOOD 2: NORMAL
D DIMER: 303 NG/ML DDU (ref 0–229)
EOSINOPHILS ABSOLUTE: 0.1 K/UL (ref 0–0.6)
EOSINOPHILS RELATIVE PERCENT: 1.4 %
ESTIMATED AVERAGE GLUCOSE: 122.6 MG/DL
FERRITIN: 2198 NG/ML (ref 30–400)
GFR AFRICAN AMERICAN: >60
GFR NON-AFRICAN AMERICAN: >60
GLOBULIN: 2.1 G/DL
GLUCOSE BLD-MCNC: 211 MG/DL (ref 70–99)
GLUCOSE BLD-MCNC: 219 MG/DL (ref 70–99)
GLUCOSE BLD-MCNC: 233 MG/DL (ref 70–99)
GLUCOSE BLD-MCNC: 255 MG/DL (ref 70–99)
HBA1C MFR BLD: 5.9 %
HCT VFR BLD CALC: 44 % (ref 40.5–52.5)
HEMOGLOBIN: 15 G/DL (ref 13.5–17.5)
LYMPHOCYTES ABSOLUTE: 0.7 K/UL (ref 1–5.1)
LYMPHOCYTES RELATIVE PERCENT: 6.9 %
MCH RBC QN AUTO: 28.9 PG (ref 26–34)
MCHC RBC AUTO-ENTMCNC: 34.2 G/DL (ref 31–36)
MCV RBC AUTO: 84.5 FL (ref 80–100)
MONOCYTES ABSOLUTE: 0.7 K/UL (ref 0–1.3)
MONOCYTES RELATIVE PERCENT: 7.1 %
NEUTROPHILS ABSOLUTE: 8.2 K/UL (ref 1.7–7.7)
NEUTROPHILS RELATIVE PERCENT: 84.4 %
PDW BLD-RTO: 13 % (ref 12.4–15.4)
PERFORMED ON: ABNORMAL
PLATELET # BLD: 259 K/UL (ref 135–450)
PMV BLD AUTO: 7 FL (ref 5–10.5)
POTASSIUM REFLEX MAGNESIUM: 3.7 MMOL/L (ref 3.5–5.1)
RBC # BLD: 5.2 M/UL (ref 4.2–5.9)
SODIUM BLD-SCNC: 142 MMOL/L (ref 136–145)
TOTAL PROTEIN: 5.3 G/DL (ref 6.4–8.2)
WBC # BLD: 9.7 K/UL (ref 4–11)

## 2020-08-08 PROCEDURE — 6370000000 HC RX 637 (ALT 250 FOR IP): Performed by: INTERNAL MEDICINE

## 2020-08-08 PROCEDURE — 2060000000 HC ICU INTERMEDIATE R&B

## 2020-08-08 PROCEDURE — 85025 COMPLETE CBC W/AUTO DIFF WBC: CPT

## 2020-08-08 PROCEDURE — 82728 ASSAY OF FERRITIN: CPT

## 2020-08-08 PROCEDURE — 86140 C-REACTIVE PROTEIN: CPT

## 2020-08-08 PROCEDURE — 99232 SBSQ HOSP IP/OBS MODERATE 35: CPT | Performed by: INTERNAL MEDICINE

## 2020-08-08 PROCEDURE — 2580000003 HC RX 258: Performed by: INTERNAL MEDICINE

## 2020-08-08 PROCEDURE — 80053 COMPREHEN METABOLIC PANEL: CPT

## 2020-08-08 PROCEDURE — 36415 COLL VENOUS BLD VENIPUNCTURE: CPT

## 2020-08-08 PROCEDURE — 6360000002 HC RX W HCPCS: Performed by: INTERNAL MEDICINE

## 2020-08-08 PROCEDURE — 94761 N-INVAS EAR/PLS OXIMETRY MLT: CPT

## 2020-08-08 PROCEDURE — 2700000000 HC OXYGEN THERAPY PER DAY

## 2020-08-08 PROCEDURE — 85379 FIBRIN DEGRADATION QUANT: CPT

## 2020-08-08 PROCEDURE — 83036 HEMOGLOBIN GLYCOSYLATED A1C: CPT

## 2020-08-08 RX ORDER — DEXTROSE MONOHYDRATE 25 G/50ML
12.5 INJECTION, SOLUTION INTRAVENOUS PRN
Status: DISCONTINUED | OUTPATIENT
Start: 2020-08-08 | End: 2020-08-14 | Stop reason: HOSPADM

## 2020-08-08 RX ORDER — NICOTINE POLACRILEX 4 MG
15 LOZENGE BUCCAL PRN
Status: DISCONTINUED | OUTPATIENT
Start: 2020-08-08 | End: 2020-08-14 | Stop reason: HOSPADM

## 2020-08-08 RX ORDER — DEXTROSE MONOHYDRATE 50 MG/ML
100 INJECTION, SOLUTION INTRAVENOUS PRN
Status: DISCONTINUED | OUTPATIENT
Start: 2020-08-08 | End: 2020-08-14 | Stop reason: HOSPADM

## 2020-08-08 RX ADMIN — ENOXAPARIN SODIUM 30 MG: 40 INJECTION SUBCUTANEOUS at 21:40

## 2020-08-08 RX ADMIN — LAMOTRIGINE 150 MG: 100 TABLET ORAL at 21:40

## 2020-08-08 RX ADMIN — ENOXAPARIN SODIUM 30 MG: 40 INJECTION SUBCUTANEOUS at 08:49

## 2020-08-08 RX ADMIN — DEXAMETHASONE 6 MG: 6 TABLET ORAL at 08:49

## 2020-08-08 RX ADMIN — SODIUM CHLORIDE, PRESERVATIVE FREE 10 ML: 5 INJECTION INTRAVENOUS at 08:48

## 2020-08-08 RX ADMIN — GUAIFENESIN AND DEXTROMETHORPHAN HYDROBROMIDE 1 TABLET: 600; 30 TABLET, EXTENDED RELEASE ORAL at 21:58

## 2020-08-08 RX ADMIN — GUAIFENESIN AND DEXTROMETHORPHAN HYDROBROMIDE 1 TABLET: 600; 30 TABLET, EXTENDED RELEASE ORAL at 08:49

## 2020-08-08 RX ADMIN — INSULIN LISPRO 2 UNITS: 100 INJECTION, SOLUTION INTRAVENOUS; SUBCUTANEOUS at 21:58

## 2020-08-08 RX ADMIN — SODIUM CHLORIDE, PRESERVATIVE FREE 10 ML: 5 INJECTION INTRAVENOUS at 21:46

## 2020-08-08 RX ADMIN — INSULIN LISPRO 6 UNITS: 100 INJECTION, SOLUTION INTRAVENOUS; SUBCUTANEOUS at 17:25

## 2020-08-08 RX ADMIN — LISINOPRIL 20 MG: 20 TABLET ORAL at 08:49

## 2020-08-08 RX ADMIN — QUETIAPINE FUMARATE 100 MG: 100 TABLET ORAL at 21:40

## 2020-08-08 RX ADMIN — INSULIN LISPRO 4 UNITS: 100 INJECTION, SOLUTION INTRAVENOUS; SUBCUTANEOUS at 11:55

## 2020-08-08 RX ADMIN — ASPIRIN 81 MG: 81 TABLET, COATED ORAL at 08:49

## 2020-08-08 RX ADMIN — AMLODIPINE BESYLATE 5 MG: 5 TABLET ORAL at 08:49

## 2020-08-08 RX ADMIN — LAMOTRIGINE 150 MG: 100 TABLET ORAL at 08:49

## 2020-08-08 RX ADMIN — QUETIAPINE FUMARATE 100 MG: 100 TABLET ORAL at 08:49

## 2020-08-08 ASSESSMENT — PAIN SCALES - GENERAL
PAINLEVEL_OUTOF10: 0

## 2020-08-08 NOTE — PLAN OF CARE
Problem: Airway Clearance - Ineffective  Goal: Achieve or maintain patent airway  8/7/2020 2354 by Juan Seo RN  Outcome: Ongoing  8/7/2020 1703 by Lita Marti RN  Outcome: Ongoing  8/7/2020 1332 by Juni Chavira RN  Outcome: Ongoing     Problem: Gas Exchange - Impaired  Goal: Absence of hypoxia  8/7/2020 2354 by Juan Seo RN  Outcome: Ongoing  8/7/2020 1703 by Lita Marti RN  Outcome: Ongoing  8/7/2020 1332 by Juni Chavira RN  Outcome: Ongoing     Problem: Gas Exchange - Impaired  Goal: Promote optimal lung function  8/7/2020 1703 by Lita Marti RN  Outcome: Ongoing  8/7/2020 1332 by Juni Chavira RN  Outcome: Ongoing     Problem: Breathing Pattern - Ineffective  Goal: Ability to achieve and maintain a regular respiratory rate  8/7/2020 2354 by Juan Seo RN  Outcome: Ongoing  8/7/2020 1703 by Lita Marti RN  Outcome: Ongoing  8/7/2020 1332 by Juni Chavira RN  Outcome: Ongoing     Problem: Body Temperature -  Risk of, Imbalanced  Goal: Ability to maintain a body temperature within defined limits  8/7/2020 2354 by Juan Seo RN  Outcome: Ongoing  8/7/2020 1703 by Lita Marti RN  Outcome: Ongoing  8/7/2020 1332 by Juni Chavira RN  Outcome: Ongoing     Problem: Body Temperature -  Risk of, Imbalanced  Goal: Will regain or maintain usual level of consciousness  8/7/2020 2354 by Juan Seo RN  Outcome: Ongoing  8/7/2020 1703 by Lita Marti RN  Outcome: Ongoing  8/7/2020 1332 by Juni Chavira RN  Outcome: Ongoing     Problem:  Body Temperature -  Risk of, Imbalanced  Goal: Complications related to the disease process, condition or treatment will be avoided or minimized  8/7/2020 2354 by Juan Seo RN  Outcome: Ongoing  8/7/2020 1703 by Lita Marti RN  Outcome: Ongoing  8/7/2020 1332 by Juni Chavira RN  Outcome: Ongoing     Problem: Isolation Precautions - Risk of Spread of Infection  Goal: Prevent transmission of infection  8/7/2020 2354 by Buck Mathews RN  Outcome: Ongoing  8/7/2020 1703 by Dang Arana RN  Outcome: Ongoing  8/7/2020 1332 by Ileana Welch RN  Outcome: Ongoing     Problem: Nutrition Deficits  Goal: Optimize nutrtional status  8/7/2020 2354 by Buck Mathews RN  Outcome: Ongoing  8/7/2020 1703 by Dang Arana RN  Outcome: Ongoing  8/7/2020 1332 by Ileana Welch RN  Outcome: Ongoing     Problem: Risk for Fluid Volume Deficit  Goal: Maintain normal heart rhythm  8/7/2020 1703 by Dang Arana RN  Outcome: Ongoing  8/7/2020 1332 by Ileana Welch RN  Outcome: Ongoing     Problem: Risk for Fluid Volume Deficit  Goal: Maintain absence of muscle cramping  8/7/2020 1703 by Dang Arana RN  Outcome: Ongoing  8/7/2020 1332 by Ileana Welch RN  Outcome: Ongoing     Problem: Risk for Fluid Volume Deficit  Goal: Maintain normal serum potassium, sodium, calcium, phosphorus, and pH  8/7/2020 1703 by Dang Arana RN  Outcome: Ongoing  8/7/2020 1332 by Ileana Welch RN  Outcome: Ongoing     Problem: Loneliness or Risk for Loneliness  Goal: Demonstrate positive use of time alone when socialization is not possible  8/7/2020 1703 by Dang Arana RN  Outcome: Ongoing  8/7/2020 1332 by Ileana Welch RN  Outcome: Ongoing     Problem: Patient Education: Go to Patient Education Activity  Goal: Patient/Family Education  8/7/2020 1703 by Dang Arana RN  Outcome: Ongoing  8/7/2020 1332 by Ileana Welch RN  Outcome: Ongoing

## 2020-08-08 NOTE — PROGRESS NOTES
Pt awake, a&o x4. Sitting up in bed eating breakfast. No c/o pain. Pt on 10L high flow oxygen, sats remain in low 90s. Pt c/o cough, PRN cough medication given per order see eMAR. Will monitor.  Electronically signed by Hipolito Betts RN on 8/8/2020 at 9:21 AM

## 2020-08-08 NOTE — PROGRESS NOTES
Discussed with patient proning to help with oxygen levels as well as using IS, demonstrated correct use.  Electronically signed by Hipolito Betts RN on 8/8/2020 at 12:20 PM

## 2020-08-08 NOTE — PROGRESS NOTES
MG tablet Take 100 mg by mouth 2 times daily      aspirin 81 MG EC tablet Take 81 mg by mouth daily         Allergies:  Bee pollen    Immunizations : There is no immunization history on file for this patient. Social History:     Social History     Tobacco Use    Smoking status: Never Smoker    Smokeless tobacco: Never Used   Substance Use Topics    Alcohol use: Yes     Comment: occ    Drug use: No     Social History     Tobacco Use   Smoking Status Never Smoker   Smokeless Tobacco Never Used      Family History   Problem Relation Age of Onset    Heart Disease Mother     Stroke Mother     Cancer Father         STOMACH      REVIEW OF SYSTEMS:    No fever / chills / sweats. No weight loss. No visual change, eye pain, eye discharge. No oral lesion, sore throat, dysphagia. Denies cough / sputum/Sob   Denies chest pain, palpitations/ dizziness  Denies nausea/ vomiting/abdominal pain/diarrhea. Denies dysuria or change in urinary function. Denies joint swelling or pain. No myalgia, arthralgia. No rashes, skin lesions   Denies focal weakness, sensory change or other neurologic symptoms  No lymph node swelling or tenderness. Fevers, sob+     PHYSICAL EXAM:      Vitals:    /64   Pulse 82   Temp 98 °F (36.7 °C) (Oral)   Resp 20   Ht 6' 4\" (1.93 m)   Wt 240 lb 15.4 oz (109.3 kg)   SpO2 91%   BMI 29.33 kg/m²     In-person bedside physical examination deferred.   Pursuant to the emergency declaration under the [facility], [facility] waiver authority and the [employer] General Act, this clinical encounter was conducted to provide necessary medical care.   (Also consistent with new provisions and guidance offered by [facility] on March 18, 2020 in setting of COVID 19 outbreak and in order to preserve personal protective equipment in accordance with the flexibilities announced by CMS on March 30, 2020) References: https://[facility]. [facility]/Portals/0/Resources/COVID-19/3_18%20Telemed%20Guidance%20Updated%20March%2018. pdf?[email or web address]                      https://[email or web address]/Portals/0/Resources/COVID-19/3_18%20Telemed%20Guidance%20Updated%20March%2018. pdf?ixq=[email or web address]                      [email or web address] pdf                                  Pulmonary: deferred  Abdomen/GI: deferred  Neuro: deferred  Skin: deferred  Musculoskeletal:  deferred  Genitourinary: Deferred  Psych: deferred  Lymphatic/Immunologic: deferred  Data Review:    Lab Results   Component Value Date    WBC 9.7 08/08/2020    HGB 15.0 08/08/2020    HCT 44.0 08/08/2020    MCV 84.5 08/08/2020     08/08/2020     Lab Results   Component Value Date    CREATININE 0.6 (L) 08/08/2020    BUN 25 (H) 08/08/2020     08/08/2020    K 3.7 08/08/2020     08/08/2020    CO2 28 08/08/2020       Hepatic Function Panel:   Lab Results   Component Value Date    ALKPHOS 93 08/08/2020     08/08/2020    AST 73 08/08/2020    PROT 5.3 08/08/2020    BILITOT 0.5 08/08/2020    LABALBU 3.2 08/08/2020       UA:  Lab Results   Component Value Date    COLORU YELLOW 08/19/2017    CLARITYU Clear 08/19/2017    GLUCOSEU Negative 08/19/2017    BILIRUBINUR Negative 08/19/2017    KETUA 40 08/19/2017    SPECGRAV >1.030 08/19/2017    BLOODU Negative 08/19/2017    PHUR 7.5 08/19/2017    PROTEINU Negative 08/19/2017    UROBILINOGEN 1.0 08/19/2017    NITRU Negative 08/19/2017    LEUKOCYTESUR Negative 08/19/2017    LABMICR Not Indicated 08/19/2017    URINETYPE Not Specified 08/19/2017      Urine Microscopic: No results found for: LABCAST, BACTERIA, COMU, HYALCAST, WBCUA, RBCUA, EPIU     Lactic acid  1.5      Ferritin  3295  Up 5636        D Dimer   345     AST  140  UP  223   ALT  189  UP  209       crp  50.5       MICRO: cultures reviewed and updated by me            Culture, Blood 2 [[id number]]   Collected: 08/04/20 1418    Order Status: Completed  Specimen: Blood  Updated: 08/05/20 1515     Culture, Blood 2  No Growth to date.  Any change in status will be called. Narrative:      ORDER#: [id number]                          ORDERED BY: Dhaval Contreras   SOURCE: Blood                              COLLECTED:  08/04/20 14:18   ANTIBIOTICS AT [facility] :                      RECEIVED :  08/04/20 14:28   If child <=2 yrs old please draw pediatric bottle. ~Blood Culture #2   Performed at:   [facility]   [address] [place] [facility] [place] [address] [address]   Phone [phone]    Culture, Blood 1 [[id number]]   Collected: 08/04/20 1418    Order Status: Completed  Specimen: Blood  Updated: 08/05/20 1515     Blood Culture, Routine  No Growth to date.  Any change in status will be called. Narrative:      ORDER#: [id number]                          ORDERED BY: Dhaval Contreras   SOURCE: Blood                              COLLECTED:  08/04/20 14:18   ANTIBIOTICS AT [facility] :                      RECEIVED :  08/04/20 14:28   If child <=2 yrs old please draw pediatric bottle. ~Blood Culture #1   Performed at:   [facility]   [address] [facility] [place] [address] [address]   Phone [phone]          IMAGING:    XR CHEST 1 VIEW   Final Result   Multifocal curvilinear and hazy opacities within the bilateral mid and lower   lung zones, most consistent with multifocal pneumonia, to include atypical   causes.                All the pertinent images and reports for the current Hospitalization were reviewed by me     Scheduled Meds:   insulin lispro  0-12 Units Subcutaneous TID WC    insulin lispro  0-6 Units Subcutaneous Nightly    [Held by provider] remdesivir IVPB  100 mg Intravenous Q24H    sodium chloride  20 mL Intravenous Once    QUEtiapine  100 mg Oral BID    [Held by provider] pravastatin  40 mg Oral Nightly    lisinopril  20 mg Oral Daily    lamoTRIgine  150 mg Oral BID    aspirin  81 mg Oral Daily    amLODIPine  5 mg Oral Daily    sodium chloride flush  10 mL Intravenous 2 times per day    enoxaparin  30 mg Subcutaneous BID    dexamethasone  6 mg Oral Daily    potassium chloride  40 mEq Oral Once       Continuous Infusions:   dextrose         PRN Meds:  glucose, dextrose, glucagon (rDNA), dextrose, dextromethorphan-guaiFENesin, sodium chloride flush, acetaminophen **OR** acetaminophen, polyethylene glycol, ondansetron, albuterol sulfate HFA, benzonatate      Assessment:     Patient Active Problem List   Diagnosis    Cholecystitis    Tenosynovitis of left wrist    COVID-19 virus infection    Acute respiratory failure due to COVID-19    Essential hypertension    Hyperlipidemia     COVID19 infection   COVID-19 Pneumonia  WBC normal with Lymphopenia  Ferritin elevation  Hypoxia using 9 lts nasal cannula  HTN   Obesity +  Presentation consistent with COVID-19 PNA     Pt had out pt COVID-19 testing and we cannot locate the results not in Epic and not in care every where  Will initiate IV Remdesivir and creat stable will consider Plasma therapy as reports now indicate giving early is more beneficial than later      Lfts elevated and have been up even before Remdesivir so likely from COVID-19 infection will have to hold Remdesivir and pt has consented for plasma and  his Ferritin is up and elevated    S/p Convalescent plasma on 8/6 and still on 10 ts Fio2 and would like Lfts to improve further before considering IV Remdesivir hopefully if resp status improves may not need any additional therapies      Labs, Microbiology, Radiology and all the pertinent results from current hospitalization and  care every where were reviewed  by me as a part of the evaluation   Plan:   1. Check COVID-19 rapid testing is positive +   2. Hold. IV Remdesivir due to elevated Lfts will consider starting once lFTS ARE better may be tomorrow   3.  Cont IV Dexamethasone 4. Type and screen done  5. S/p Convalescent plasma pt has consented and forms signed and infusion completed on  8/6  6. Trend D dimer, ferritin, CRP now slow improvement      Discussed with patient/Family and Nursing         Addendum: Pt now enrolled in [address] [place] [address] plasma program [address] [place] [address] protocol  Date  8/6/20    Patient Code :  852195      Risk of Complications/Morbidity: High      · Illness(es)/ Infection present that pose threat to bodily function. · There is potential for severe exacerbation of infection/side effects of treatment. · Therapy requires intensive monitoring for antimicrobial agent toxicity      Discussed with patient/Family and Nursing staff     Thanks for allowing me to participate in your patient's care and please call me with any questions or concerns.     Billie Mejía MD  Infectious Disease  [facility]) Physician  Phone: [phone]   Fax : [phone]

## 2020-08-08 NOTE — PLAN OF CARE
Problem: Airway Clearance - Ineffective  Goal: Achieve or maintain patent airway  8/8/2020 0912 by Heather Barba RN  Outcome: Ongoing     Problem: Gas Exchange - Impaired  Goal: Absence of hypoxia  8/8/2020 0912 by Heather Barba RN  Outcome: Ongoing     Problem: Gas Exchange - Impaired  Goal: Promote optimal lung function  Outcome: Ongoing     Problem: Breathing Pattern - Ineffective  Goal: Ability to achieve and maintain a regular respiratory rate  8/8/2020 0912 by Heather Barba RN  Outcome: Ongoing     Problem: Body Temperature -  Risk of, Imbalanced  Goal: Ability to maintain a body temperature within defined limits  8/8/2020 0912 by Heather Barba RN  Outcome: Ongoing     Problem: Body Temperature -  Risk of, Imbalanced  Goal: Will regain or maintain usual level of consciousness  8/8/2020 0912 by Heather Barba RN  Outcome: Ongoing     Problem:  Body Temperature -  Risk of, Imbalanced  Goal: Complications related to the disease process, condition or treatment will be avoided or minimized  8/8/2020 0912 by Heather Barba RN  Outcome: Ongoing     Problem: Isolation Precautions - Risk of Spread of Infection  Goal: Prevent transmission of infection  8/8/2020 0912 by Heather Barba RN  Outcome: Ongoing     Problem: Nutrition Deficits  Goal: Optimize nutrtional status  8/8/2020 0912 by Heather Barba RN  Outcome: Ongoing     Problem: Risk for Fluid Volume Deficit  Goal: Maintain normal heart rhythm  Outcome: Ongoing     Problem: Risk for Fluid Volume Deficit  Goal: Maintain absence of muscle cramping  Outcome: Ongoing     Problem: Risk for Fluid Volume Deficit  Goal: Maintain normal serum potassium, sodium, calcium, phosphorus, and pH  Outcome: Ongoing     Problem: Loneliness or Risk for Loneliness  Goal: Demonstrate positive use of time alone when socialization is not possible  Outcome: Ongoing     Problem: Fatigue  Goal: Verbalize increase energy and improved vitality  Outcome: Ongoing     Problem: Patient Education: Go to Patient Education Activity  Goal: Patient/Family Education  Outcome: Ongoing     Problem: Falls - Risk of:  Goal: Will remain free from falls  Description: Will remain free from falls  Outcome: Ongoing     Problem: Falls - Risk of:  Goal: Absence of physical injury  Description: Absence of physical injury  Outcome: Ongoing     Problem: Nutrition  Goal: Optimal nutrition therapy  Outcome: Ongoing

## 2020-08-08 NOTE — PROGRESS NOTES
times per day    enoxaparin  30 mg Subcutaneous BID    dexamethasone  6 mg Oral Daily    potassium chloride  40 mEq Oral Once     PRN Meds: glucose, dextrose, glucagon (rDNA), dextrose, dextromethorphan-guaiFENesin, sodium chloride flush, acetaminophen **OR** acetaminophen, polyethylene glycol, ondansetron, albuterol sulfate HFA, benzonatate      Intake/Output Summary (Last 24 hours) at 8/8/2020 1244  Last data filed at 8/8/2020 0847  Gross per 24 hour   Intake 960 ml   Output --   Net 960 ml       Physical Exam Performed:    /62   Pulse 81   Temp 97.5 °F (36.4 °C) (Oral)   Resp 22   Ht 6' 4\" (1.93 m)   Wt 240 lb 15.4 oz (109.3 kg)   SpO2 92%   BMI 29.33 kg/m²     General appearance: No apparent distress, appears stated age and cooperative. HEENT: Pupils equal, round, and reactive to light. Conjunctivae/corneas clear. Neck: Supple, with full range of motion. No jugular venous distention. Trachea midline. Respiratory:  Normal respiratory effort. Bibasilar rales. On 10 L high flow. Cardiovascular: Regular rate and rhythm with normal S1/S2 without murmurs, rubs or gallops. Abdomen: Soft, non-tender, non-distended with normal bowel sounds. Musculoskeletal: No clubbing, cyanosis or edema bilaterally. Full range of motion without deformity. Skin: Skin color, texture, turgor normal.  No rashes or lesions. Neurologic:  Neurovascularly intact without any focal sensory/motor deficits.  Cranial nerves: II-XII intact, grossly non-focal.  Psychiatric: Alert and oriented, thought content appropriate, normal insight  Capillary Refill: Brisk,< 3 seconds   Peripheral Pulses: +2 palpable, equal bilaterally       Labs:   Recent Labs     08/06/20  0523 08/07/20  0455 08/08/20  0649   WBC 9.2 7.8 9.7   HGB 14.2 13.8 15.0   HCT 41.0 39.9* 44.0    207 259     Recent Labs     08/06/20  0523 08/07/20  0455 08/08/20  0649    140 142   K 3.9 4.1 3.7    104 107   CO2 26 26 28   BUN 28* 31* 25* CREATININE 0.7* 0.8 0.6*   CALCIUM 7.8* 7.6* 7.9*     Recent Labs     08/06/20  0523 08/07/20  0455 08/08/20  0649   * 65* 73*   * 155* 175*   BILITOT 0.6 0.5 0.5   ALKPHOS 106 92 93     Recent Labs     08/06/20  0523   INR 0.89     No results for input(s): CKTOTAL, TROPONINI in the last 72 hours. Urinalysis:      Lab Results   Component Value Date    NITRU Negative 08/19/2017    BLOODU Negative 08/19/2017    SPECGRAV >1.030 08/19/2017    GLUCOSEU Negative 08/19/2017       Radiology:  XR CHEST 1 VIEW   Final Result   Multifocal curvilinear and hazy opacities within the bilateral mid and lower   lung zones, most consistent with multifocal pneumonia, to include atypical   causes. Assessment/Plan:    #COVID-19 pneumonia  Decadron 6 mg daily for 10 days  Lovenox twice daily  Infectious disease consult for remdesivir, started on August 5 but stopped on August 6 due to elevated LFTs, LFT still trending up.  convalescent plasma 8/6  We will hold on Actemra due to recent studies  Inflammatory markers ferritin, CRP trending down. # acute respiratory failure with hypoxia  Secondary to COVID-19 pneumonia  Continue to titrate oxygen to keep saturations above 90%  On 10 L high flow. Encouraged to use incentive spirometer, prone positioning. #Hyperglycemia suspected secondary to IV Decadron. We will add insulin sliding scale. Check hemoglobin A1c level. Change diet to carb controlled diet. #Essential (primary) hypertension - continue home meds and monitor blood pressure     #Hyperlipidemia - No current evidence of Rhabdomyolysis or other adverse effects. Continue statin therapy while in the hospital    DVT Prophylaxis: Lovenox  Diet: DIET GENERAL;  Dietary Nutrition Supplements: Standard High Calorie Oral Supplement  Code Status: Full Code    PT/OT Eval Status: Not applicable    Dispo -inpatient wean off oxygen to keep SaO2 greater than 90%.     Jass Barclay MD

## 2020-08-09 LAB
A/G RATIO: 1.2 (ref 1.1–2.2)
ALBUMIN SERPL-MCNC: 3 G/DL (ref 3.4–5)
ALP BLD-CCNC: 92 U/L (ref 40–129)
ALT SERPL-CCNC: 179 U/L (ref 10–40)
ANION GAP SERPL CALCULATED.3IONS-SCNC: 10 MMOL/L (ref 3–16)
AST SERPL-CCNC: 46 U/L (ref 15–37)
BILIRUB SERPL-MCNC: 0.7 MG/DL (ref 0–1)
BUN BLDV-MCNC: 24 MG/DL (ref 7–20)
CALCIUM SERPL-MCNC: 7.9 MG/DL (ref 8.3–10.6)
CHLORIDE BLD-SCNC: 104 MMOL/L (ref 99–110)
CO2: 24 MMOL/L (ref 21–32)
CREAT SERPL-MCNC: 0.6 MG/DL (ref 0.8–1.3)
D DIMER: 443 NG/ML DDU (ref 0–229)
ESTIMATED AVERAGE GLUCOSE: 119.8 MG/DL
GFR AFRICAN AMERICAN: >60
GFR NON-AFRICAN AMERICAN: >60
GLOBULIN: 2.6 G/DL
GLUCOSE BLD-MCNC: 132 MG/DL (ref 70–99)
GLUCOSE BLD-MCNC: 145 MG/DL (ref 70–99)
GLUCOSE BLD-MCNC: 205 MG/DL (ref 70–99)
GLUCOSE BLD-MCNC: 208 MG/DL (ref 70–99)
GLUCOSE BLD-MCNC: 225 MG/DL (ref 70–99)
HBA1C MFR BLD: 5.8 %
PERFORMED ON: ABNORMAL
POTASSIUM REFLEX MAGNESIUM: 3.9 MMOL/L (ref 3.5–5.1)
SODIUM BLD-SCNC: 138 MMOL/L (ref 136–145)
TOTAL PROTEIN: 5.6 G/DL (ref 6.4–8.2)

## 2020-08-09 PROCEDURE — 2580000003 HC RX 258: Performed by: INTERNAL MEDICINE

## 2020-08-09 PROCEDURE — 2060000000 HC ICU INTERMEDIATE R&B

## 2020-08-09 PROCEDURE — 80053 COMPREHEN METABOLIC PANEL: CPT

## 2020-08-09 PROCEDURE — 85025 COMPLETE CBC W/AUTO DIFF WBC: CPT

## 2020-08-09 PROCEDURE — 6370000000 HC RX 637 (ALT 250 FOR IP): Performed by: INTERNAL MEDICINE

## 2020-08-09 PROCEDURE — 2500000003 HC RX 250 WO HCPCS: Performed by: INTERNAL MEDICINE

## 2020-08-09 PROCEDURE — 2700000000 HC OXYGEN THERAPY PER DAY

## 2020-08-09 PROCEDURE — 6360000002 HC RX W HCPCS: Performed by: INTERNAL MEDICINE

## 2020-08-09 PROCEDURE — 85379 FIBRIN DEGRADATION QUANT: CPT

## 2020-08-09 PROCEDURE — 94761 N-INVAS EAR/PLS OXIMETRY MLT: CPT

## 2020-08-09 PROCEDURE — 83036 HEMOGLOBIN GLYCOSYLATED A1C: CPT

## 2020-08-09 PROCEDURE — 99232 SBSQ HOSP IP/OBS MODERATE 35: CPT | Performed by: INTERNAL MEDICINE

## 2020-08-09 PROCEDURE — 36415 COLL VENOUS BLD VENIPUNCTURE: CPT

## 2020-08-09 RX ADMIN — AMLODIPINE BESYLATE 5 MG: 5 TABLET ORAL at 08:31

## 2020-08-09 RX ADMIN — SODIUM CHLORIDE, PRESERVATIVE FREE 10 ML: 5 INJECTION INTRAVENOUS at 22:29

## 2020-08-09 RX ADMIN — LAMOTRIGINE 150 MG: 100 TABLET ORAL at 08:30

## 2020-08-09 RX ADMIN — REMDESIVIR 100 MG: 100 INJECTION, POWDER, LYOPHILIZED, FOR SOLUTION INTRAVENOUS at 15:04

## 2020-08-09 RX ADMIN — DEXAMETHASONE 6 MG: 6 TABLET ORAL at 08:31

## 2020-08-09 RX ADMIN — QUETIAPINE FUMARATE 100 MG: 100 TABLET ORAL at 22:28

## 2020-08-09 RX ADMIN — LAMOTRIGINE 150 MG: 100 TABLET ORAL at 22:28

## 2020-08-09 RX ADMIN — SODIUM CHLORIDE, PRESERVATIVE FREE 10 ML: 5 INJECTION INTRAVENOUS at 08:31

## 2020-08-09 RX ADMIN — ENOXAPARIN SODIUM 30 MG: 40 INJECTION SUBCUTANEOUS at 08:31

## 2020-08-09 RX ADMIN — LISINOPRIL 20 MG: 20 TABLET ORAL at 08:31

## 2020-08-09 RX ADMIN — INSULIN LISPRO 4 UNITS: 100 INJECTION, SOLUTION INTRAVENOUS; SUBCUTANEOUS at 11:48

## 2020-08-09 RX ADMIN — QUETIAPINE FUMARATE 100 MG: 100 TABLET ORAL at 08:31

## 2020-08-09 RX ADMIN — ENOXAPARIN SODIUM 30 MG: 40 INJECTION SUBCUTANEOUS at 22:29

## 2020-08-09 RX ADMIN — GUAIFENESIN AND DEXTROMETHORPHAN HYDROBROMIDE 1 TABLET: 600; 30 TABLET, EXTENDED RELEASE ORAL at 08:31

## 2020-08-09 RX ADMIN — INSULIN LISPRO 4 UNITS: 100 INJECTION, SOLUTION INTRAVENOUS; SUBCUTANEOUS at 17:36

## 2020-08-09 RX ADMIN — ASPIRIN 81 MG: 81 TABLET, COATED ORAL at 08:31

## 2020-08-09 RX ADMIN — INSULIN LISPRO 2 UNITS: 100 INJECTION, SOLUTION INTRAVENOUS; SUBCUTANEOUS at 22:29

## 2020-08-09 RX ADMIN — GUAIFENESIN AND DEXTROMETHORPHAN HYDROBROMIDE 1 TABLET: 600; 30 TABLET, EXTENDED RELEASE ORAL at 22:28

## 2020-08-09 ASSESSMENT — PAIN SCALES - GENERAL: PAINLEVEL_OUTOF10: 0

## 2020-08-09 NOTE — PLAN OF CARE
Problem: Airway Clearance - Ineffective  Goal: Achieve or maintain patent airway  Outcome: Ongoing     Problem: Gas Exchange - Impaired  Goal: Absence of hypoxia  Outcome: Ongoing  Goal: Promote optimal lung function  Outcome: Ongoing     Problem: Breathing Pattern - Ineffective  Goal: Ability to achieve and maintain a regular respiratory rate  Outcome: Ongoing  Pt respiratory status assessed. No s/s of respiratory complications. Oxygen saturations >90% at all times with supplemental O2 as needed. Encourage to cough and deep breath. Encourage HHN as ordered. Encourage IS. Will assess respiratory status every shift and PRN. Problem:  Body Temperature -  Risk of, Imbalanced  Goal: Ability to maintain a body temperature within defined limits  Outcome: Ongoing  Goal: Will regain or maintain usual level of consciousness  Outcome: Ongoing  Goal: Complications related to the disease process, condition or treatment will be avoided or minimized  Outcome: Ongoing     Problem: Isolation Precautions - Risk of Spread of Infection  Goal: Prevent transmission of infection  Outcome: Ongoing     Problem: Nutrition Deficits  Goal: Optimize nutrtional status  Outcome: Ongoing     Problem: Risk for Fluid Volume Deficit  Goal: Maintain normal heart rhythm  Outcome: Ongoing  Goal: Maintain absence of muscle cramping  Outcome: Ongoing  Goal: Maintain normal serum potassium, sodium, calcium, phosphorus, and pH  Outcome: Ongoing     Problem: Loneliness or Risk for Loneliness  Goal: Demonstrate positive use of time alone when socialization is not possible  Outcome: Ongoing     Problem: Fatigue  Goal: Verbalize increase energy and improved vitality  Outcome: Ongoing     Problem: Patient Education: Go to Patient Education Activity  Goal: Patient/Family Education  Outcome: Ongoing

## 2020-08-09 NOTE — PROGRESS NOTES
Hospitalist Progress Note      PCP: John Whipple    Date of Admission: 8/4/2020    Chief Complaint: SOB    Hospital Course: Patient is a 43-year-old man with history of hypertension and hyperlipidemia who presents to the emergency room for evaluation of cough and shortness of breath. He states that he was diagnosed with COVID-19 is an outpatient on or about 7/27/2020. Approximately three weeks ago he was on vacation with 25 other people at the [facility] in [place]. Since that time several of his companions have been diagnosed with COVID-19. Over the past several days here developed a cough and worsening shortness of breath. In the emergency room he was found to have an oxygen saturation in the mid 80s on room air. Patient was started on IV Relistor which was stopped due to elevated liver enzymes. Status post plasma convulsant on 8/6      Subjective: Patient said he tried prone position yesterday but he could not tolerate. But he did slept on his sides. He still on 10 L high flow. Low-grade temp. Leukocytosis 12.3 from 9.7 denies worsening shortness of breath or cough, chest pain, abdominal pain, nausea vomiting. Inflammatory markers are trending down.   Medications:  Reviewed    Infusion Medications    dextrose       Scheduled Medications    insulin lispro  0-12 Units Subcutaneous TID WC    insulin lispro  0-6 Units Subcutaneous Nightly    remdesivir IVPB  100 mg Intravenous Q24H    sodium chloride  20 mL Intravenous Once    QUEtiapine  100 mg Oral BID    [Held by provider] pravastatin  40 mg Oral Nightly    lisinopril  20 mg Oral Daily    lamoTRIgine  150 mg Oral BID    aspirin  81 mg Oral Daily    amLODIPine  5 mg Oral Daily    sodium chloride flush  10 mL Intravenous 2 times per day    enoxaparin  30 mg Subcutaneous BID    dexamethasone  6 mg Oral Daily    potassium chloride  40 mEq Oral Once     PRN Meds: glucose, dextrose, glucagon (rDNA), dextrose, dextromethorphan-guaiFENesin, sodium chloride flush, acetaminophen **OR** acetaminophen, polyethylene glycol, ondansetron, albuterol sulfate HFA, benzonatate      Intake/Output Summary (Last 24 hours) at 8/9/2020 1408  Last data filed at 8/9/2020 0833  Gross per 24 hour   Intake 840 ml   Output 450 ml   Net 390 ml       Physical Exam Performed:    /66   Pulse 93   Temp 99.2 °F (37.3 °C) (Oral)   Resp 22   Ht 6' 4\" (1.93 m)   Wt 244 lb 0.8 oz (110.7 kg)   SpO2 90%   BMI 29.71 kg/m²     General appearance: No apparent distress, appears stated age and cooperative. HEENT: Pupils equal, round, and reactive to light. Conjunctivae/corneas clear. Neck: Supple, with full range of motion. No jugular venous distention. Trachea midline. Respiratory:  Normal respiratory effort. Bibasilar rales. On 10 L high flow. Cardiovascular: Regular rate and rhythm with normal S1/S2 without murmurs, rubs or gallops. Abdomen: Soft, non-tender, non-distended with normal bowel sounds. Musculoskeletal: No clubbing, cyanosis or edema bilaterally. Full range of motion without deformity. Skin: Skin color, texture, turgor normal.  No rashes or lesions. Neurologic:  Neurovascularly intact without any focal sensory/motor deficits.  Cranial nerves: II-XII intact, grossly non-focal.  Psychiatric: Alert and oriented, thought content appropriate, normal insight  Capillary Refill: Brisk,< 3 seconds   Peripheral Pulses: +2 palpable, equal bilaterally       Labs:   Recent Labs     08/07/20 0455 08/08/20 0649 08/09/20  0717   WBC 7.8 9.7 12.3*   HGB 13.8 15.0 14.6   HCT 39.9* 44.0 42.6    259 271     Recent Labs     08/07/20 0455 08/08/20  0649 08/09/20  0717    142 138   K 4.1 3.7 3.9    107 104   CO2 26 28 24   BUN 31* 25* 24*   CREATININE 0.8 0.6* 0.6*   CALCIUM 7.6* 7.9* 7.9*     Recent Labs     08/07/20 0455 08/08/20  0649 08/09/20  0717   AST 65* 73* 46*   * 175* 179*   BILITOT 0.5 0.5 0.7   ALKPHOS

## 2020-08-09 NOTE — PLAN OF CARE
Problem: Airway Clearance - Ineffective  Goal: Achieve or maintain patent airway  8/9/2020 0728 by Divya Sawyer RN  Outcome: Ongoing     Problem: Gas Exchange - Impaired  Goal: Absence of hypoxia  8/9/2020 0728 by Divya Sawyer RN  Outcome: Ongoing     Problem: Gas Exchange - Impaired  Goal: Promote optimal lung function  8/9/2020 0728 by Divya Sawyer RN  Outcome: Ongoing     Problem: Breathing Pattern - Ineffective  Goal: Ability to achieve and maintain a regular respiratory rate  8/9/2020 0728 by Divya Sawyer RN  Outcome: Ongoing     Problem: Body Temperature -  Risk of, Imbalanced  Goal: Ability to maintain a body temperature within defined limits  8/9/2020 0728 by Divya Sawyre RN  Outcome: Ongoing     Problem: Body Temperature -  Risk of, Imbalanced  Goal: Will regain or maintain usual level of consciousness  8/9/2020 0728 by Divya Sawyer RN  Outcome: Ongoing     Problem:  Body Temperature -  Risk of, Imbalanced  Goal: Complications related to the disease process, condition or treatment will be avoided or minimized  8/9/2020 0728 by Divya Sawyer RN  Outcome: Ongoing     Problem: Isolation Precautions - Risk of Spread of Infection  Goal: Prevent transmission of infection  8/9/2020 0728 by Divya Sawyer RN  Outcome: Ongoing     Problem: Nutrition Deficits  Goal: Optimize nutrtional status  8/9/2020 0728 by Divya Sawyer RN  Outcome: Ongoing     Problem: Risk for Fluid Volume Deficit  Goal: Maintain normal heart rhythm  8/9/2020 0728 by Divya Sawyer RN  Outcome: Ongoing     Problem: Risk for Fluid Volume Deficit  Goal: Maintain absence of muscle cramping  8/9/2020 0728 by Divya Sawyer RN  Outcome: Ongoing     Problem: Nutrition  Goal: Optimal nutrition therapy  8/9/2020 0728 by Divya Sawyer RN  Outcome: Ongoing     Problem: Falls - Risk of:  Goal: Absence of physical injury  Description: Absence of physical injury  8/9/2020 0728 by Divya Sawyer RN  Outcome: Ongoing     Problem: Falls - Risk

## 2020-08-09 NOTE — PROGRESS NOTES
Infectious Disease Follow up Notes  Admit Date: 8/4/2020  Hospital Day: 6    Antibiotics :   Dexamethasone  IV Remdesivir resumed on 8/9   S/p Convalescent plasma on 8/6     CHIEF COMPLAINT:     COVID-19 Pneumonia  Fevers  Sob    Subjective interval History :  60 y.o. man admitted on 8/4 with fevers, sob, cough was diagnosed with COVID -19 infection as  Outpatient and we cannot see the results in Epic or in care every where but apparently had other people with him tested positive for COVID-19 per HPI as pt was recently out on vacation. He has HTN, AND hyperlipidemia. Fevers at home now in hospital at 99.5 and  Elevation in Lfts, creat normal , WBC normal with Lymphopenia.  CXR with bi lateral PNA noted and he is on  9 lts nasal cannula as his Oxygen requirements are going up we are consulted for recommendations      Remains on 10lts nasal cannula hypoxic with  Movements and no fevers Lfts improving and not normal yet and Lymphocyte counts improving      Past Medical History:    Past Medical History:   Diagnosis Date    GERD (gastroesophageal reflux disease)     Hyperlipidemia     Hypertension     Kidney stone        Past Surgical History:    Past Surgical History:   Procedure Laterality Date    CHOLECYSTECTOMY  08/19/2017    Lap    COLONOSCOPY  10/16/2015    CYSTOSCOPY      KIDNEY STONES    ENDOSCOPY, COLON, DIAGNOSTIC  08/08/2017    UPPER GASTROINTESTINAL ENDOSCOPY  07/14/2017       Current Medications:    Outpatient Medications Marked as Taking for the 8/4/20 encounter [facility]OS[facility] Encounter)   Medication Sig Dispense Refill    lisinopril (PRINIVIL;ZESTRIL) 20 MG tablet Take 20 mg by mouth daily      amLODIPine (NORVASC) 5 MG tablet Take 5 mg by mouth daily      pravastatin (PRAVACHOL) 40 MG tablet Take 40 mg by mouth nightly       lamoTRIgine (LAMICTAL) 150 MG tablet Take 150 mg by mouth 2 times daily      QUEtiapine 30, 2020)   References: https://[facility]. [facility]/[email or web address]/Resources/COVID-19/3_18%20Telemed%20Guidance%20Updated%20March%2018. pdf?[email or web address]                      https://[email or web address]/[email or web address]/Resources/COVID-19/3_18%20Telemed%20Guidance%20Updated%20March%2018. pdf?[email or web address]                      [email or web address] pdf                                  Pulmonary: deferred  Abdomen/GI: deferred  Neuro: deferred  Skin: deferred  Musculoskeletal:  deferred  Genitourinary: Deferred  Psych: deferred  Lymphatic/Immunologic: deferred  Data Review:    Lab Results   Component Value Date    WBC 12.3 (H) 08/09/2020    HGB 14.6 08/09/2020    HCT 42.6 08/09/2020    MCV 84.6 08/09/2020     08/09/2020     Lab Results   Component Value Date    CREATININE 0.6 (L) 08/09/2020    BUN 24 (H) 08/09/2020     08/09/2020    K 3.9 08/09/2020     08/09/2020    CO2 24 08/09/2020       Hepatic Function Panel:   Lab Results   Component Value Date    ALKPHOS 92 08/09/2020     08/09/2020    AST 46 08/09/2020    PROT 5.6 08/09/2020    BILITOT 0.7 08/09/2020    LABALBU 3.0 08/09/2020       UA:  Lab Results   Component Value Date    COLORU YELLOW 08/19/2017    CLARITYU Clear 08/19/2017    GLUCOSEU Negative 08/19/2017    BILIRUBINUR Negative 08/19/2017    KETUA 40 08/19/2017    SPECGRAV >1.030 08/19/2017    BLOODU Negative 08/19/2017    PHUR 7.5 08/19/2017    PROTEINU Negative 08/19/2017    UROBILINOGEN 1.0 08/19/2017    NITRU Negative 08/19/2017    LEUKOCYTESUR Negative 08/19/2017    LABMICR Not Indicated 08/19/2017    URINETYPE Not Specified 08/19/2017      Urine Microscopic: No results found for: LABCAST, BACTERIA, COMU, HYALCAST, WBCUA, RBCUA, EPIU     Lactic acid  1.5      Ferritin  3295  Up 5636        D Dimer   345     AST  140  UP  223   ALT  189  UP  209       crp  50.5       MICRO: cultures reviewed and updated by me            Culture, Blood 2 [[id number]]   Collected: 08/04/20 1418    Order Status: Completed  Specimen: Blood  Updated: 08/05/20 1515     Culture, Blood 2  No Growth to date.  Any change in status will be called. Narrative:      ORDER#: [id number]                          ORDERED BY: Francoise Simon   SOURCE: Blood                              COLLECTED:  08/04/20 14:18   ANTIBIOTICS AT [facility] :                      RECEIVED :  08/04/20 14:28   If child <=2 yrs old please draw pediatric bottle. ~Blood Culture #2   Performed at:   [address] [place] [address]   Phone [phone]    Culture, Blood 1 [[id number]]   Collected: 08/04/20 1418    Order Status: Completed  Specimen: Blood  Updated: 08/05/20 1515     Blood Culture, Routine  No Growth to date.  Any change in status will be called. Narrative:      ORDER#: [id number]                          ORDERED BY: Francoise Simon   SOURCE: Blood                              COLLECTED:  08/04/20 14:18   ANTIBIOTICS AT [facility] :                      RECEIVED :  08/04/20 14:28   If child <=2 yrs old please draw pediatric bottle. ~Blood Culture #1   Performed at:   [address] [place], [place] [address]   Phone [phone]          IMAGING:    XR CHEST 1 VIEW   Final Result   Multifocal curvilinear and hazy opacities within the bilateral mid and lower   lung zones, most consistent with multifocal pneumonia, to include atypical   causes.                All the pertinent images and reports for the current Hospitalization were reviewed by me     Scheduled Meds:   insulin lispro  0-12 Units Subcutaneous TID     insulin lispro  0-6 Units Subcutaneous Nightly    remdesivir IVPB  100 mg Intravenous Q24H    sodium chloride  20 mL Intravenous Once    QUEtiapine  100 mg Oral BID    [Held by provider] pravastatin  40 mg Oral Nightly    lisinopril  20 mg Oral Daily    lamoTRIgine  150 mg Oral BID    aspirin  81 mg Oral Daily    amLODIPine  5 mg Oral Daily    sodium chloride flush  10 mL Intravenous 2 times per day    enoxaparin  30 mg Subcutaneous BID    dexamethasone  6 mg Oral Daily    potassium chloride  40 mEq Oral Once       Continuous Infusions:   dextrose         PRN Meds:  glucose, dextrose, glucagon (rDNA), dextrose, dextromethorphan-guaiFENesin, sodium chloride flush, acetaminophen **OR** acetaminophen, polyethylene glycol, ondansetron, albuterol sulfate HFA, benzonatate      Assessment:     Patient Active Problem List   Diagnosis    Cholecystitis    Tenosynovitis of left wrist    COVID-19 virus infection    Acute respiratory failure due to COVID-19    Essential hypertension    Hyperlipidemia     COVID19 infection   COVID-19 Pneumonia  WBC normal with Lymphopenia  Ferritin elevation  Hypoxia using 9 lts nasal cannula  HTN   Obesity +  Presentation consistent with COVID-19 PNA     Pt had out pt COVID-19 testing and we cannot locate the results not in Epic and not in care every where  Will initiate IV Remdesivir and creat stable will consider Plasma therapy as reports now indicate giving early is more beneficial than later      Lfts elevated and have been up even before Remdesivir so likely from COVID-19 infection will have to hold Remdesivir and pt has consented for plasma and  his Ferritin is up and elevated    S/p Convalescent plasma on 8/6 and still on 10 ts Fio2 and would like Lfts to improved will resume IV Remdesivir and monitor the tests closely     Labs, Microbiology, Radiology and all the pertinent results from current hospitalization and  care every where were reviewed  by me as a part of the evaluation   Plan:   1. Check COVID-19 rapid testing is positive +   2. Resume  IV Remdesivir x 100 mg daily will have to watch Lfts closely   3. Cont IV Dexamethasone   4. Type and screen done  5.  S/p Convalescent plasma pt has consented and forms signed and infusion completed on  8/6 6. Trend D dimer, ferritin, CRP now slow improvement      Discussed with patient/Family and Nursing         Addendum: Pt now enrolled in [address] [place] [address] plasma program [address] [place] [address] protocol  Date  8/6/20    Patient Code :  [id number]          Discussed with patient/Family and Nursing staff     Thanks for allowing me to participate in your patient's care and please call me with any questions or concerns.     Barrington Del Real MD  Infectious Disease  [facility]) Physician  Phone: [phone]   Fax : [phone]

## 2020-08-10 LAB
A/G RATIO: 1.4 (ref 1.1–2.2)
ALBUMIN SERPL-MCNC: 3 G/DL (ref 3.4–5)
ALP BLD-CCNC: 84 U/L (ref 40–129)
ALT SERPL-CCNC: 123 U/L (ref 10–40)
ANION GAP SERPL CALCULATED.3IONS-SCNC: 8 MMOL/L (ref 3–16)
AST SERPL-CCNC: 25 U/L (ref 15–37)
ATYPICAL LYMPHOCYTE RELATIVE PERCENT: 1 % (ref 0–6)
BANDED NEUTROPHILS RELATIVE PERCENT: 5 % (ref 0–7)
BANDED NEUTROPHILS RELATIVE PERCENT: 9 % (ref 0–7)
BASOPHILS ABSOLUTE: 0 K/UL (ref 0–0.2)
BASOPHILS ABSOLUTE: 0 K/UL (ref 0–0.2)
BASOPHILS RELATIVE PERCENT: 0 %
BASOPHILS RELATIVE PERCENT: 0 %
BILIRUB SERPL-MCNC: 0.8 MG/DL (ref 0–1)
BUN BLDV-MCNC: 19 MG/DL (ref 7–20)
CALCIUM SERPL-MCNC: 7.9 MG/DL (ref 8.3–10.6)
CHLORIDE BLD-SCNC: 106 MMOL/L (ref 99–110)
CO2: 25 MMOL/L (ref 21–32)
CREAT SERPL-MCNC: 0.6 MG/DL (ref 0.8–1.3)
D DIMER: 611 NG/ML DDU (ref 0–229)
EOSINOPHILS ABSOLUTE: 0 K/UL (ref 0–0.6)
EOSINOPHILS ABSOLUTE: 0 K/UL (ref 0–0.6)
EOSINOPHILS RELATIVE PERCENT: 0 %
EOSINOPHILS RELATIVE PERCENT: 0 %
GFR AFRICAN AMERICAN: >60
GFR NON-AFRICAN AMERICAN: >60
GLOBULIN: 2.1 G/DL
GLUCOSE BLD-MCNC: 115 MG/DL (ref 70–99)
GLUCOSE BLD-MCNC: 123 MG/DL (ref 70–99)
GLUCOSE BLD-MCNC: 169 MG/DL (ref 70–99)
GLUCOSE BLD-MCNC: 226 MG/DL (ref 70–99)
GLUCOSE BLD-MCNC: 245 MG/DL (ref 70–99)
HCT VFR BLD CALC: 41.5 % (ref 40.5–52.5)
HCT VFR BLD CALC: 42.6 % (ref 40.5–52.5)
HEMATOLOGY PATH CONSULT: NORMAL
HEMATOLOGY PATH CONSULT: YES
HEMOGLOBIN: 14.1 G/DL (ref 13.5–17.5)
HEMOGLOBIN: 14.6 G/DL (ref 13.5–17.5)
LYMPHOCYTES ABSOLUTE: 0.6 K/UL (ref 1–5.1)
LYMPHOCYTES ABSOLUTE: 1.8 K/UL (ref 1–5.1)
LYMPHOCYTES RELATIVE PERCENT: 14 %
LYMPHOCYTES RELATIVE PERCENT: 5 %
MCH RBC QN AUTO: 28.6 PG (ref 26–34)
MCH RBC QN AUTO: 28.9 PG (ref 26–34)
MCHC RBC AUTO-ENTMCNC: 33.9 G/DL (ref 31–36)
MCHC RBC AUTO-ENTMCNC: 34.2 G/DL (ref 31–36)
MCV RBC AUTO: 84.5 FL (ref 80–100)
MCV RBC AUTO: 84.6 FL (ref 80–100)
METAMYELOCYTES RELATIVE PERCENT: 3 %
METAMYELOCYTES RELATIVE PERCENT: 4 %
MONOCYTES ABSOLUTE: 0.8 K/UL (ref 0–1.3)
MONOCYTES ABSOLUTE: 1.6 K/UL (ref 0–1.3)
MONOCYTES RELATIVE PERCENT: 13 %
MONOCYTES RELATIVE PERCENT: 6 %
NEUTROPHILS ABSOLUTE: 11.2 K/UL (ref 1.7–7.7)
NEUTROPHILS ABSOLUTE: 8.9 K/UL (ref 1.7–7.7)
NEUTROPHILS RELATIVE PERCENT: 64 %
NEUTROPHILS RELATIVE PERCENT: 76 %
PDW BLD-RTO: 12.6 % (ref 12.4–15.4)
PDW BLD-RTO: 13 % (ref 12.4–15.4)
PERFORMED ON: ABNORMAL
PLATELET # BLD: 267 K/UL (ref 135–450)
PLATELET # BLD: 271 K/UL (ref 135–450)
PLATELET SLIDE REVIEW: ADEQUATE
PLATELET SLIDE REVIEW: ADEQUATE
PMV BLD AUTO: 6.8 FL (ref 5–10.5)
PMV BLD AUTO: 7.1 FL (ref 5–10.5)
POTASSIUM REFLEX MAGNESIUM: 3.7 MMOL/L (ref 3.5–5.1)
RBC # BLD: 4.92 M/UL (ref 4.2–5.9)
RBC # BLD: 5.04 M/UL (ref 4.2–5.9)
RBC # BLD: NORMAL 10*6/UL
RBC # BLD: NORMAL 10*6/UL
SLIDE REVIEW: ABNORMAL
SLIDE REVIEW: ABNORMAL
SODIUM BLD-SCNC: 139 MMOL/L (ref 136–145)
TOTAL PROTEIN: 5.1 G/DL (ref 6.4–8.2)
WBC # BLD: 12.3 K/UL (ref 4–11)
WBC # BLD: 12.6 K/UL (ref 4–11)

## 2020-08-10 PROCEDURE — 2580000003 HC RX 258: Performed by: INTERNAL MEDICINE

## 2020-08-10 PROCEDURE — 1200000000 HC SEMI PRIVATE

## 2020-08-10 PROCEDURE — 6370000000 HC RX 637 (ALT 250 FOR IP): Performed by: INTERNAL MEDICINE

## 2020-08-10 PROCEDURE — 80053 COMPREHEN METABOLIC PANEL: CPT

## 2020-08-10 PROCEDURE — 99233 SBSQ HOSP IP/OBS HIGH 50: CPT | Performed by: INTERNAL MEDICINE

## 2020-08-10 PROCEDURE — 2500000003 HC RX 250 WO HCPCS: Performed by: INTERNAL MEDICINE

## 2020-08-10 PROCEDURE — 2700000000 HC OXYGEN THERAPY PER DAY

## 2020-08-10 PROCEDURE — 85379 FIBRIN DEGRADATION QUANT: CPT

## 2020-08-10 PROCEDURE — 6360000002 HC RX W HCPCS: Performed by: INTERNAL MEDICINE

## 2020-08-10 PROCEDURE — 85025 COMPLETE CBC W/AUTO DIFF WBC: CPT

## 2020-08-10 PROCEDURE — 36415 COLL VENOUS BLD VENIPUNCTURE: CPT

## 2020-08-10 PROCEDURE — 94761 N-INVAS EAR/PLS OXIMETRY MLT: CPT

## 2020-08-10 RX ADMIN — LISINOPRIL 20 MG: 20 TABLET ORAL at 08:34

## 2020-08-10 RX ADMIN — AMLODIPINE BESYLATE 5 MG: 5 TABLET ORAL at 08:34

## 2020-08-10 RX ADMIN — QUETIAPINE FUMARATE 100 MG: 100 TABLET ORAL at 08:34

## 2020-08-10 RX ADMIN — SODIUM CHLORIDE, PRESERVATIVE FREE 10 ML: 5 INJECTION INTRAVENOUS at 08:35

## 2020-08-10 RX ADMIN — GUAIFENESIN AND DEXTROMETHORPHAN HYDROBROMIDE 1 TABLET: 600; 30 TABLET, EXTENDED RELEASE ORAL at 12:28

## 2020-08-10 RX ADMIN — REMDESIVIR 100 MG: 100 INJECTION, POWDER, LYOPHILIZED, FOR SOLUTION INTRAVENOUS at 15:49

## 2020-08-10 RX ADMIN — DEXAMETHASONE 6 MG: 6 TABLET ORAL at 08:33

## 2020-08-10 RX ADMIN — INSULIN LISPRO 4 UNITS: 100 INJECTION, SOLUTION INTRAVENOUS; SUBCUTANEOUS at 17:42

## 2020-08-10 RX ADMIN — ENOXAPARIN SODIUM 30 MG: 40 INJECTION SUBCUTANEOUS at 20:42

## 2020-08-10 RX ADMIN — ASPIRIN 81 MG: 81 TABLET, COATED ORAL at 08:33

## 2020-08-10 RX ADMIN — INSULIN LISPRO 2 UNITS: 100 INJECTION, SOLUTION INTRAVENOUS; SUBCUTANEOUS at 12:28

## 2020-08-10 RX ADMIN — SODIUM CHLORIDE, PRESERVATIVE FREE 10 ML: 5 INJECTION INTRAVENOUS at 21:46

## 2020-08-10 RX ADMIN — LAMOTRIGINE 150 MG: 100 TABLET ORAL at 20:42

## 2020-08-10 RX ADMIN — QUETIAPINE FUMARATE 100 MG: 100 TABLET ORAL at 20:42

## 2020-08-10 RX ADMIN — INSULIN LISPRO 2 UNITS: 100 INJECTION, SOLUTION INTRAVENOUS; SUBCUTANEOUS at 20:42

## 2020-08-10 RX ADMIN — ENOXAPARIN SODIUM 30 MG: 40 INJECTION SUBCUTANEOUS at 08:35

## 2020-08-10 RX ADMIN — LAMOTRIGINE 150 MG: 100 TABLET ORAL at 08:33

## 2020-08-10 ASSESSMENT — PAIN SCALES - GENERAL: PAINLEVEL_OUTOF10: 0

## 2020-08-10 NOTE — PLAN OF CARE
Ongoing  Goal: Maintain absence of muscle cramping  8/10/2020 0758 by Dang Arana RN  Outcome: Ongoing  8/10/2020 0131 by Sumeet Palumbo RN  Outcome: Ongoing  Goal: Maintain normal serum potassium, sodium, calcium, phosphorus, and pH  8/10/2020 0758 by Dang Arana RN  Outcome: Ongoing  8/10/2020 0131 by Sumeet Palumbo RN  Outcome: Ongoing     Problem: Fatigue  Goal: Verbalize increase energy and improved vitality  8/10/2020 0758 by Dang Arana RN  Outcome: Ongoing  8/10/2020 0131 by Sumeet Palumbo RN  Outcome: Ongoing     Problem: Patient Education: Go to Patient Education Activity  Goal: Patient/Family Education  8/10/2020 5844 by Dang Arana RN  Outcome: Ongoing  8/10/2020 0131 by Sumeet Palumbo RN  Outcome: Ongoing     Problem: Falls - Risk of:  Goal: Will remain free from falls  Description: Will remain free from falls  8/10/2020 0758 by Dang rAana RN  Outcome: Ongoing  8/10/2020 0131 by Sumeet Palumbo RN  Outcome: Ongoing  Goal: Absence of physical injury  Description: Absence of physical injury  8/10/2020 0758 by Dang Arana RN  Outcome: Ongoing  8/10/2020 0131 by Sumeet Palumbo RN  Outcome: Ongoing     Problem: Nutrition  Goal: Optimal nutrition therapy  8/10/2020 0758 by Dang Arana RN  Outcome: Ongoing  8/10/2020 0131 by Sumeet Palumbo RN  Outcome: Ongoing

## 2020-08-10 NOTE — PROGRESS NOTES
Infectious Disease Follow up Notes  Admit Date: 8/4/2020  Hospital Day: 7    Antibiotics :   Dexamethasone  IV Remdesivir resumed on 8/9   S/p Convalescent plasma on 8/6     CHIEF COMPLAINT:     COVID-19 Pneumonia  Fevers  Sob    Subjective interval History :  60 y.o. man admitted on 8/4 with fevers, sob, cough was diagnosed with COVID -19 infection as  Outpatient and we cannot see the results in Epic or in care every where but apparently had other people with him tested positive for COVID-19 per HPI as pt was recently out on vacation. He has HTN, AND hyperlipidemia. Fevers at home now in hospital at 99.5 and  Elevation in Lfts, creat normal , WBC normal with Lymphopenia.  CXR with bi lateral PNA noted and he is on  9 lts nasal cannula as his Oxygen requirements are going up we are consulted for recommendations      Remains on 10lts nasal cannula hypoxic tolerated some prone position and Lfts better resumed Remdesivir will trend and hopefully Fio2  Improves    Past Medical History:    Past Medical History:   Diagnosis Date    GERD (gastroesophageal reflux disease)     Hyperlipidemia     Hypertension     Kidney stone        Past Surgical History:    Past Surgical History:   Procedure Laterality Date    CHOLECYSTECTOMY  08/19/2017    Lap    COLONOSCOPY  10/16/2015    CYSTOSCOPY      KIDNEY STONES    ENDOSCOPY, COLON, DIAGNOSTIC  08/08/2017    UPPER GASTROINTESTINAL ENDOSCOPY  07/14/2017       Current Medications:    Outpatient Medications Marked as Taking for the 8/4/20 encounter [facility] HOSPITAL Encounter)   Medication Sig Dispense Refill    lisinopril (PRINIVIL;ZESTRIL) 20 MG tablet Take 20 mg by mouth daily      amLODIPine (NORVASC) 5 MG tablet Take 5 mg by mouth daily      pravastatin (PRAVACHOL) 40 MG tablet Take 40 mg by mouth nightly       lamoTRIgine (LAMICTAL) 150 MG tablet Take 150 mg by mouth 2 times daily      QUEtiapine (SEROQUEL) 100 MG tablet Take 100 mg by mouth 2 times daily      aspirin 81 MG EC tablet Take 81 mg by mouth daily         Allergies:  Bee pollen    Immunizations : There is no immunization history on file for this patient. Social History:     Social History     Tobacco Use    Smoking status: Never Smoker    Smokeless tobacco: Never Used   Substance Use Topics    Alcohol use: Yes     Comment: occ    Drug use: No     Social History     Tobacco Use   Smoking Status Never Smoker   Smokeless Tobacco Never Used      Family History   Problem Relation Age of Onset    Heart Disease Mother     Stroke Mother     Cancer Father         STOMACH      REVIEW OF SYSTEMS:    No fever / chills / sweats. No weight loss. No visual change, eye pain, eye discharge. No oral lesion, sore throat, dysphagia. Denies cough / sputum/Sob   Denies chest pain, palpitations/ dizziness  Denies nausea/ vomiting/abdominal pain/diarrhea. Denies dysuria or change in urinary function. Denies joint swelling or pain. No myalgia, arthralgia. No rashes, skin lesions   Denies focal weakness, sensory change or other neurologic symptoms  No lymph node swelling or tenderness. Fevers, sob+     PHYSICAL EXAM:      Vitals:    /84   Pulse 77   Temp 98.3 °F (36.8 °C) (Axillary)   Resp 20   Ht 6' 4\" (1.93 m)   Wt 245 lb 13 oz (111.5 kg)   SpO2 92%   BMI 29.92 kg/m²     In-person bedside physical examination deferred.   Pursuant to the emergency declaration under the [facility] waiver authority and the [employer] Act, this clinical encounter was conducted to provide necessary medical care.   (Also consistent with new provisions and guidance offered by [employer] on March 18, 2020 in setting of COVID 19 outbreak and in order to preserve personal protective equipment in accordance with the flexibilities announced by CMS on March 30, 2020)   References: https://[facility]. [facility]/Portals/0/Resources/COVID-19/3_18%20Telemed%20Guidance%20Updated%20March%2018. pdf?bvu=[email or web address]                      https://[facility]/Portals/0/Resources/COVID-19/3_18%20Telemed%20Guidance%20Updated%20March%2018. pdf?amn=[email or web address]                      [email or web address] pdf                                  Pulmonary: deferred  Abdomen/GI: deferred  Neuro: deferred  Skin: deferred  Musculoskeletal:  deferred  Genitourinary: Deferred  Psych: deferred  Lymphatic/Immunologic: deferred  Data Review:    Lab Results   Component Value Date    WBC 12.6 (H) 08/10/2020    HGB 14.1 08/10/2020    HCT 41.5 08/10/2020    MCV 84.5 08/10/2020     08/10/2020     Lab Results   Component Value Date    CREATININE 0.6 (L) 08/10/2020    BUN 19 08/10/2020     08/10/2020    K 3.7 08/10/2020     08/10/2020    CO2 25 08/10/2020       Hepatic Function Panel:   Lab Results   Component Value Date    ALKPHOS 84 08/10/2020     08/10/2020    AST 25 08/10/2020    PROT 5.1 08/10/2020    BILITOT 0.8 08/10/2020    LABALBU 3.0 08/10/2020       UA:  Lab Results   Component Value Date    COLORU YELLOW 08/19/2017    CLARITYU Clear 08/19/2017    GLUCOSEU Negative 08/19/2017    BILIRUBINUR Negative 08/19/2017    KETUA 40 08/19/2017    SPECGRAV >1.030 08/19/2017    BLOODU Negative 08/19/2017    PHUR 7.5 08/19/2017    PROTEINU Negative 08/19/2017    UROBILINOGEN 1.0 08/19/2017    NITRU Negative 08/19/2017    LEUKOCYTESUR Negative 08/19/2017    LABMICR Not Indicated 08/19/2017    URINETYPE Not Specified 08/19/2017      Urine Microscopic: No results found for: LABCAST, BACTERIA, COMU, HYALCAST, WBCUA, RBCUA, EPIU     Lactic acid  1.5      Ferritin  3295  Up 5636        D Dimer   345     AST  140  UP  223   ALT  189  UP  209       crp  50.5       MICRO: cultures reviewed and updated by me            Culture, lamoTRIgine  150 mg Oral BID    aspirin  81 mg Oral Daily    amLODIPine  5 mg Oral Daily    sodium chloride flush  10 mL Intravenous 2 times per day    enoxaparin  30 mg Subcutaneous BID    dexamethasone  6 mg Oral Daily    potassium chloride  40 mEq Oral Once       Continuous Infusions:   dextrose         PRN Meds:  glucose, dextrose, glucagon (rDNA), dextrose, dextromethorphan-guaiFENesin, sodium chloride flush, acetaminophen **OR** acetaminophen, polyethylene glycol, ondansetron, albuterol sulfate HFA, benzonatate      Assessment:     Patient Active Problem List   Diagnosis    Cholecystitis    Tenosynovitis of left wrist    COVID-19 virus infection    Acute respiratory failure due to COVID-19    Essential hypertension    Hyperlipidemia     COVID19 infection   COVID-19 Pneumonia  WBC normal with Lymphopenia  Ferritin elevation  Hypoxia using 9 lts nasal cannula  HTN   Obesity +  Presentation consistent with COVID-19 PNA     Pt had out pt COVID-19 testing and we cannot locate the results not in Epic and not in care every where  Will initiate IV Remdesivir and creat stable will consider Plasma therapy as reports now indicate giving early is more beneficial than later      Lfts elevated and have been up even before Remdesivir so likely from COVID-19 infection will have to hold Remdesivir and pt has consented for plasma and  his Ferritin is up and elevated    S/p Convalescent plasma on 8/6 and still on 10 ts Fio2 and would like Lfts to improved will resume IV Remdesivir and monitor the tests closely thus far tolerating it ok     Labs, Microbiology, Radiology and all the pertinent results from current hospitalization and  care every where were reviewed  by me as a part of the evaluation   Plan:   1. Check COVID-19 rapid testing is positive +   2. Resume  IV Remdesivir x 100 mg daily will have to watch Lfts closely started on 8/9  3. Cont IV Dexamethasone   4. Type and screen done  5.  S/p Convalescent plasma pt has consented and forms signed and infusion completed on  8/6 6. Trend D dimer, ferritin, CRP now slow improvement check tomorrow       Discussed with patient/Family and Nursing d/w Pharmacy        Addendum: Pt now enrolled in [address] [place] [address] plasma program [address] [place] [address] protocol  Date  8/6/20    Patient Code :  [id number]    Risk of Complications/Morbidity: High      · Illness(es)/ Infection present that pose threat to bodily function. · There is potential for severe exacerbation of infection/side effects of treatment. · Therapy requires intensive monitoring for antimicrobial agent toxicity. Discussed with patient/Family and Nursing staff     Thanks for allowing me to participate in your patient's care and please call me with any questions or concerns.     Mulugeta Arredondo MD  Infectious Disease  [facility] ([facility]) Physician  Phone: [phone]   Fax : [phone]

## 2020-08-10 NOTE — PLAN OF CARE
Problem: Airway Clearance - Ineffective  Goal: Achieve or maintain patent airway  Outcome: Ongoing     Problem: Gas Exchange - Impaired  Goal: Absence of hypoxia  Outcome: Ongoing  Goal: Promote optimal lung function  Outcome: Ongoing     Problem: Breathing Pattern - Ineffective  Goal: Ability to achieve and maintain a regular respiratory rate  Outcome: Ongoing     Problem:  Body Temperature -  Risk of, Imbalanced  Goal: Ability to maintain a body temperature within defined limits  Outcome: Ongoing  Goal: Will regain or maintain usual level of consciousness  Outcome: Ongoing  Goal: Complications related to the disease process, condition or treatment will be avoided or minimized  Outcome: Ongoing     Problem: Isolation Precautions - Risk of Spread of Infection  Goal: Prevent transmission of infection  Outcome: Ongoing     Problem: Nutrition Deficits  Goal: Optimize nutrtional status  Outcome: Ongoing     Problem: Risk for Fluid Volume Deficit  Goal: Maintain normal heart rhythm  Outcome: Ongoing  Goal: Maintain absence of muscle cramping  Outcome: Ongoing  Goal: Maintain normal serum potassium, sodium, calcium, phosphorus, and pH  Outcome: Ongoing     Problem: Loneliness or Risk for Loneliness  Goal: Demonstrate positive use of time alone when socialization is not possible  Outcome: Ongoing     Problem: Fatigue  Goal: Verbalize increase energy and improved vitality  Outcome: Ongoing     Problem: Patient Education: Go to Patient Education Activity  Goal: Patient/Family Education  Outcome: Ongoing     Problem: Falls - Risk of:  Goal: Will remain free from falls  Description: Will remain free from falls  Outcome: Ongoing  Goal: Absence of physical injury  Description: Absence of physical injury  Outcome: Ongoing     Problem: Nutrition  Goal: Optimal nutrition therapy  Outcome: Ongoing     Problem: Discharge Planning:  Goal: Discharged to appropriate level of care  Description: Discharged to appropriate level of care  Outcome: Ongoing

## 2020-08-10 NOTE — PROGRESS NOTES
Hospitalist Progress Note      PCP: Reese Sandoval    Date of Admission: 8/4/2020    Chief Complaint: SOB    Hospital Course: Patient is a 29-year-old man with history of hypertension and hyperlipidemia who presents to the emergency room for evaluation of cough and shortness of breath. He states that he was diagnosed with COVID-19 is an outpatient on or about 7/27/2020. Approximately three weeks ago he was on vacation with 25 other people at the [facility] in [place]. Since that time several of his companions have been diagnosed with COVID-19. Over the past several days here developed a cough and worsening shortness of breath. In the emergency room he was found to have an oxygen saturation in the mid 80s on room air. Patient was started on IV Remdesivir - which was stopped due to elevated liver enzymes - resumed - to complete the course. Status post convalescent plasma on 8/6  On dexamethasone. Subjective: Tolerating some time proned.    Remains on 10l/min oxygen - baseline RA      Medications:  Reviewed    Infusion Medications    dextrose       Scheduled Medications    insulin lispro  0-12 Units Subcutaneous TID WC    insulin lispro  0-6 Units Subcutaneous Nightly    remdesivir IVPB  100 mg Intravenous Q24H    sodium chloride  20 mL Intravenous Once    QUEtiapine  100 mg Oral BID    [Held by provider] pravastatin  40 mg Oral Nightly    lisinopril  20 mg Oral Daily    lamoTRIgine  150 mg Oral BID    aspirin  81 mg Oral Daily    amLODIPine  5 mg Oral Daily    sodium chloride flush  10 mL Intravenous 2 times per day    enoxaparin  30 mg Subcutaneous BID    dexamethasone  6 mg Oral Daily    potassium chloride  40 mEq Oral Once     PRN Meds: glucose, dextrose, glucagon (rDNA), dextrose, dextromethorphan-guaiFENesin, sodium chloride flush, acetaminophen **OR** acetaminophen, polyethylene glycol, ondansetron, albuterol sulfate HFA, benzonatate      Intake/Output Summary (Last 24 hours) at 8/10/2020 1213  Last data filed at 8/10/2020 1004  Gross per 24 hour   Intake 900 ml   Output 250 ml   Net 650 ml       Physical Exam Performed:    /84   Pulse 75   Temp 98.3 °F (36.8 °C) (Axillary)   Resp 20   Ht 6' 4\" (1.93 m)   Wt 245 lb 13 oz (111.5 kg)   SpO2 92%   BMI 29.92 kg/m²     General appearance: No apparent distress, appears stated age and cooperative. HEENT: Pupils equal, round, and reactive to light. Conjunctivae/corneas clear. Neck: Supple, with full range of motion. No jugular venous distention. Trachea midline. Respiratory:  Normal respiratory effort. Bibasilar rales. On 10 L high flow. Cardiovascular: Regular rate and rhythm with normal S1/S2 without murmurs, rubs or gallops. Abdomen: Soft, non-tender, non-distended with normal bowel sounds. Musculoskeletal: No clubbing, cyanosis or edema bilaterally. Full range of motion without deformity. Skin: Skin color, texture, turgor normal.  No rashes or lesions. Neurologic:  Neurovascularly intact without any focal sensory/motor deficits. Cranial nerves: II-XII intact, grossly non-focal.  Psychiatric: Alert and oriented, thought content appropriate, normal insight  Capillary Refill: Brisk,< 3 seconds   Peripheral Pulses: +2 palpable, equal bilaterally       Labs:   Recent Labs     08/08/20  0649 08/09/20  0717 08/10/20  0716   WBC 9.7 12.3* 12.6*   HGB 15.0 14.6 14.1   HCT 44.0 42.6 41.5    271 267     Recent Labs     08/08/20  0649 08/09/20  0717 08/10/20  0716    138 139   K 3.7 3.9 3.7    104 106   CO2 28 24 25   BUN 25* 24* 19   CREATININE 0.6* 0.6* 0.6*   CALCIUM 7.9* 7.9* 7.9*     Recent Labs     08/08/20  0649 08/09/20  0717 08/10/20  0716   AST 73* 46* 25   * 179* 123*   BILITOT 0.5 0.7 0.8   ALKPHOS 93 92 84     No results for input(s): INR in the last 72 hours. No results for input(s): Buzz Shown in the last 72 hours.     Urinalysis:      Lab Results   Component Value

## 2020-08-11 ENCOUNTER — APPOINTMENT (OUTPATIENT)
Dept: GENERAL RADIOLOGY | Age: 65
DRG: 177 | End: 2020-08-11
Payer: COMMERCIAL

## 2020-08-11 LAB
A/G RATIO: 1.4 (ref 1.1–2.2)
ALBUMIN SERPL-MCNC: 2.9 G/DL (ref 3.4–5)
ALP BLD-CCNC: 77 U/L (ref 40–129)
ALT SERPL-CCNC: 91 U/L (ref 10–40)
ANION GAP SERPL CALCULATED.3IONS-SCNC: 9 MMOL/L (ref 3–16)
AST SERPL-CCNC: 22 U/L (ref 15–37)
BANDED NEUTROPHILS RELATIVE PERCENT: 8 % (ref 0–7)
BASOPHILS ABSOLUTE: 0 K/UL (ref 0–0.2)
BASOPHILS RELATIVE PERCENT: 0 %
BILIRUB SERPL-MCNC: 0.5 MG/DL (ref 0–1)
BUN BLDV-MCNC: 18 MG/DL (ref 7–20)
C-REACTIVE PROTEIN: 25.7 MG/L (ref 0–5.1)
CALCIUM SERPL-MCNC: 8 MG/DL (ref 8.3–10.6)
CHLORIDE BLD-SCNC: 107 MMOL/L (ref 99–110)
CO2: 24 MMOL/L (ref 21–32)
CREAT SERPL-MCNC: 0.6 MG/DL (ref 0.8–1.3)
D DIMER: 600 NG/ML DDU (ref 0–229)
EOSINOPHILS ABSOLUTE: 0 K/UL (ref 0–0.6)
EOSINOPHILS RELATIVE PERCENT: 0 %
FERRITIN: 1079 NG/ML (ref 30–400)
GFR AFRICAN AMERICAN: >60
GFR NON-AFRICAN AMERICAN: >60
GLOBULIN: 2.1 G/DL
GLUCOSE BLD-MCNC: 105 MG/DL (ref 70–99)
GLUCOSE BLD-MCNC: 113 MG/DL (ref 70–99)
GLUCOSE BLD-MCNC: 124 MG/DL (ref 70–99)
GLUCOSE BLD-MCNC: 230 MG/DL (ref 70–99)
GLUCOSE BLD-MCNC: 281 MG/DL (ref 70–99)
HCT VFR BLD CALC: 41.3 % (ref 40.5–52.5)
HEMOGLOBIN: 14 G/DL (ref 13.5–17.5)
LYMPHOCYTES ABSOLUTE: 0.7 K/UL (ref 1–5.1)
LYMPHOCYTES RELATIVE PERCENT: 5 %
MCH RBC QN AUTO: 28.6 PG (ref 26–34)
MCHC RBC AUTO-ENTMCNC: 33.9 G/DL (ref 31–36)
MCV RBC AUTO: 84.4 FL (ref 80–100)
METAMYELOCYTES RELATIVE PERCENT: 3 %
MONOCYTES ABSOLUTE: 0.8 K/UL (ref 0–1.3)
MONOCYTES RELATIVE PERCENT: 6 %
NEUTROPHILS ABSOLUTE: 11.9 K/UL (ref 1.7–7.7)
NEUTROPHILS RELATIVE PERCENT: 78 %
PDW BLD-RTO: 13.1 % (ref 12.4–15.4)
PERFORMED ON: ABNORMAL
PLATELET # BLD: 306 K/UL (ref 135–450)
PLATELET SLIDE REVIEW: ADEQUATE
PMV BLD AUTO: 7.5 FL (ref 5–10.5)
POTASSIUM REFLEX MAGNESIUM: 4.1 MMOL/L (ref 3.5–5.1)
RBC # BLD: 4.9 M/UL (ref 4.2–5.9)
RBC # BLD: NORMAL 10*6/UL
SLIDE REVIEW: ABNORMAL
SODIUM BLD-SCNC: 140 MMOL/L (ref 136–145)
TOTAL PROTEIN: 5 G/DL (ref 6.4–8.2)
VACUOLATED NEUTROPHILS: PRESENT
WBC # BLD: 13.4 K/UL (ref 4–11)

## 2020-08-11 PROCEDURE — 85025 COMPLETE CBC W/AUTO DIFF WBC: CPT

## 2020-08-11 PROCEDURE — 99233 SBSQ HOSP IP/OBS HIGH 50: CPT | Performed by: INTERNAL MEDICINE

## 2020-08-11 PROCEDURE — 6360000002 HC RX W HCPCS: Performed by: INTERNAL MEDICINE

## 2020-08-11 PROCEDURE — 2580000003 HC RX 258: Performed by: INTERNAL MEDICINE

## 2020-08-11 PROCEDURE — 80053 COMPREHEN METABOLIC PANEL: CPT

## 2020-08-11 PROCEDURE — 1200000000 HC SEMI PRIVATE

## 2020-08-11 PROCEDURE — 85379 FIBRIN DEGRADATION QUANT: CPT

## 2020-08-11 PROCEDURE — 86140 C-REACTIVE PROTEIN: CPT

## 2020-08-11 PROCEDURE — 2700000000 HC OXYGEN THERAPY PER DAY

## 2020-08-11 PROCEDURE — 82728 ASSAY OF FERRITIN: CPT

## 2020-08-11 PROCEDURE — 6370000000 HC RX 637 (ALT 250 FOR IP): Performed by: INTERNAL MEDICINE

## 2020-08-11 PROCEDURE — 2500000003 HC RX 250 WO HCPCS: Performed by: INTERNAL MEDICINE

## 2020-08-11 PROCEDURE — 71045 X-RAY EXAM CHEST 1 VIEW: CPT

## 2020-08-11 PROCEDURE — 36415 COLL VENOUS BLD VENIPUNCTURE: CPT

## 2020-08-11 PROCEDURE — 94761 N-INVAS EAR/PLS OXIMETRY MLT: CPT

## 2020-08-11 RX ADMIN — LAMOTRIGINE 150 MG: 100 TABLET ORAL at 20:29

## 2020-08-11 RX ADMIN — LAMOTRIGINE 150 MG: 100 TABLET ORAL at 09:04

## 2020-08-11 RX ADMIN — ASPIRIN 81 MG: 81 TABLET, COATED ORAL at 09:04

## 2020-08-11 RX ADMIN — ENOXAPARIN SODIUM 30 MG: 40 INJECTION SUBCUTANEOUS at 20:29

## 2020-08-11 RX ADMIN — INSULIN LISPRO 3 UNITS: 100 INJECTION, SOLUTION INTRAVENOUS; SUBCUTANEOUS at 20:35

## 2020-08-11 RX ADMIN — ENOXAPARIN SODIUM 30 MG: 40 INJECTION SUBCUTANEOUS at 09:04

## 2020-08-11 RX ADMIN — REMDESIVIR 100 MG: 100 INJECTION, POWDER, LYOPHILIZED, FOR SOLUTION INTRAVENOUS at 16:08

## 2020-08-11 RX ADMIN — LISINOPRIL 20 MG: 20 TABLET ORAL at 09:04

## 2020-08-11 RX ADMIN — INSULIN LISPRO 4 UNITS: 100 INJECTION, SOLUTION INTRAVENOUS; SUBCUTANEOUS at 17:40

## 2020-08-11 RX ADMIN — QUETIAPINE FUMARATE 100 MG: 100 TABLET ORAL at 20:29

## 2020-08-11 RX ADMIN — DEXAMETHASONE 6 MG: 6 TABLET ORAL at 09:04

## 2020-08-11 RX ADMIN — SODIUM CHLORIDE, PRESERVATIVE FREE 10 ML: 5 INJECTION INTRAVENOUS at 09:05

## 2020-08-11 RX ADMIN — AMLODIPINE BESYLATE 5 MG: 5 TABLET ORAL at 09:04

## 2020-08-11 RX ADMIN — SODIUM CHLORIDE, PRESERVATIVE FREE 10 ML: 5 INJECTION INTRAVENOUS at 20:54

## 2020-08-11 RX ADMIN — QUETIAPINE FUMARATE 100 MG: 100 TABLET ORAL at 09:05

## 2020-08-11 ASSESSMENT — PAIN SCALES - GENERAL
PAINLEVEL_OUTOF10: 0
PAINLEVEL_OUTOF10: 0

## 2020-08-11 NOTE — PROGRESS NOTES
mg by mouth 2 times daily      aspirin 81 MG EC tablet Take 81 mg by mouth daily         Allergies:  Bee pollen    Immunizations : There is no immunization history on file for this patient. Social History:     Social History     Tobacco Use    Smoking status: Never Smoker    Smokeless tobacco: Never Used   Substance Use Topics    Alcohol use: Yes     Comment: occ    Drug use: No     Social History     Tobacco Use   Smoking Status Never Smoker   Smokeless Tobacco Never Used      Family History   Problem Relation Age of Onset    Heart Disease Mother     Stroke Mother     Cancer Father         STOMACH      REVIEW OF SYSTEMS:    No fever / chills / sweats. No weight loss. No visual change, eye pain, eye discharge. No oral lesion, sore throat, dysphagia. Denies cough / sputum/Sob   Denies chest pain, palpitations/ dizziness  Denies nausea/ vomiting/abdominal pain/diarrhea. Denies dysuria or change in urinary function. Denies joint swelling or pain. No myalgia, arthralgia. No rashes, skin lesions   Denies focal weakness, sensory change or other neurologic symptoms  No lymph node swelling or tenderness. Fevers, sob+     PHYSICAL EXAM:      Vitals:    /78   Pulse 75   Temp 98.3 °F (36.8 °C) (Oral)   Resp 18   Ht 6' 4\" (1.93 m)   Wt 245 lb 2.4 oz (111.2 kg)   SpO2 91%   BMI 29.84 kg/m²     In-person bedside physical examination deferred.   Pursuant to the emergency declaration under the [facility] waiver authority and the [employer] Act, this clinical encounter was conducted to provide necessary medical care.   (Also consistent with new provisions and guidance offered by [facility] on March 18, 2020 in setting of COVID 19 outbreak and in order to preserve personal protective equipment in accordance with the flexibilities announced by CMS on March 30, 2020)   References: https://[facility]. [facility]/Portals/0/Resources/COVID-19/3_18%20Telemed%20Guidance%20Updated%20March%2018. pdf?izz=[email or web address]                      https://[facility]/Portals/0/Resources/COVID-19/3_18%20Telemed%20Guidance%20Updated%20March%2018. pdf?wjp=[email or web address]                      http://[employer]/. pdf                                  Pulmonary: deferred  Abdomen/GI: deferred  Neuro: deferred  Skin: deferred  Musculoskeletal:  deferred  Genitourinary: Deferred  Psych: deferred  Lymphatic/Immunologic: deferred  Data Review:    Lab Results   Component Value Date    WBC 13.4 (H) 08/11/2020    HGB 14.0 08/11/2020    HCT 41.3 08/11/2020    MCV 84.4 08/11/2020     08/11/2020     Lab Results   Component Value Date    CREATININE 0.6 (L) 08/11/2020    BUN 18 08/11/2020     08/11/2020    K 4.1 08/11/2020     08/11/2020    CO2 24 08/11/2020       Hepatic Function Panel:   Lab Results   Component Value Date    ALKPHOS 77 08/11/2020    ALT 91 08/11/2020    AST 22 08/11/2020    PROT 5.0 08/11/2020    BILITOT 0.5 08/11/2020    LABALBU 2.9 08/11/2020       UA:  Lab Results   Component Value Date    COLORU YELLOW 08/19/2017    CLARITYU Clear 08/19/2017    GLUCOSEU Negative 08/19/2017    BILIRUBINUR Negative 08/19/2017    KETUA 40 08/19/2017    SPECGRAV >1.030 08/19/2017    BLOODU Negative 08/19/2017    PHUR 7.5 08/19/2017    PROTEINU Negative 08/19/2017    UROBILINOGEN 1.0 08/19/2017    NITRU Negative 08/19/2017    LEUKOCYTESUR Negative 08/19/2017    LABMICR Not Indicated 08/19/2017    URINETYPE Not Specified 08/19/2017      Urine Microscopic: No results found for: LABCAST, BACTERIA, COMU, HYALCAST, WBCUA, RBCUA, EPIU     Lactic acid  1.5      Ferritin  3295  Up 5636        D Dimer   345     AST  140  UP  223   ALT  189  UP  209       crp  50.5       MICRO: cultures reviewed and updated by me            Culture, Blood 2 [[id number]] Collected: 08/04/20 1418    Order Status: Completed  Specimen: Blood  Updated: 08/05/20 1515     Culture, Blood 2  No Growth to date.  Any change in status will be called. Narrative:      ORDER#: [id number]                          ORDERED BY: Adrian Yin   SOURCE: Blood                              COLLECTED:  08/04/20 14:18   ANTIBIOTICS AT [facility] :                      RECEIVED :  08/04/20 14:28   If child <=2 yrs old please draw pediatric bottle. ~Blood Culture #2   Performed at:   [address] [place] [address]   Phone [phone]    Culture, Blood 1 [[id number]]   Collected: 08/04/20 1418    Order Status: Completed  Specimen: Blood  Updated: 08/05/20 1515     Blood Culture, Routine  No Growth to date.  Any change in status will be called. Narrative:      ORDER#: [id number]                          ORDERED BY: Adrian Yin   SOURCE: Blood                              COLLECTED:  08/04/20 14:18   ANTIBIOTICS AT [facility] :                      RECEIVED :  08/04/20 14:28   If child <=2 yrs old please draw pediatric bottle. ~Blood Culture #1   Performed at:   [address] [place] [address]   Phone [phone]          IMAGING:    XR CHEST 1 VIEW   Final Result   Multifocal curvilinear and hazy opacities within the bilateral mid and lower   lung zones, most consistent with multifocal pneumonia, to include atypical   causes.          XR CHEST PORTABLE    (Results Pending)         All the pertinent images and reports for the current Hospitalization were reviewed by me     Scheduled Meds:   insulin lispro  0-12 Units Subcutaneous TID     insulin lispro  0-6 Units Subcutaneous Nightly    remdesivir IVPB  100 mg Intravenous Q24H    sodium chloride  20 mL Intravenous Once    QUEtiapine  100 mg Oral BID    [Held by provider] pravastatin  40 mg Oral Nightly    lisinopril  20 mg Oral Daily    lamoTRIgine  150 mg Oral BID    aspirin  81 mg Oral Daily    amLODIPine  5 mg Oral Daily    sodium chloride flush  10 mL Intravenous 2 times per day    enoxaparin  30 mg Subcutaneous BID    dexamethasone  6 mg Oral Daily    potassium chloride  40 mEq Oral Once       Continuous Infusions:   dextrose         PRN Meds:  glucose, dextrose, glucagon (rDNA), dextrose, dextromethorphan-guaiFENesin, sodium chloride flush, acetaminophen **OR** acetaminophen, polyethylene glycol, ondansetron, albuterol sulfate HFA, benzonatate      Assessment:     Patient Active Problem List   Diagnosis    Cholecystitis    Tenosynovitis of left wrist    COVID-19 virus infection    Acute respiratory failure due to COVID-19    Essential hypertension    Hyperlipidemia     COVID19 infection   COVID-19 Pneumonia  WBC normal with Lymphopenia  Ferritin elevation  Hypoxia using  nasal cannula  HTN   Obesity +  Presentation consistent with COVID-19 PNA     Pt had out pt COVID-19 testing and we cannot locate the results not in Epic and not in care every where  Will initiate IV Remdesivir and creat stable will consider Plasma therapy as reports now indicate giving early is more beneficial than later      Lfts elevated and have been up even before Remdesivir so likely from COVID-19 infection will have to hold Remdesivir and pt has consented for plasma and  his Ferritin is up and elevated    S/p Convalescent plasma on 8/6 and still on 10 ts Fio2 and would like Lfts to improved will resume IV Remdesivir and monitor the tests closely thus far tolerating it ok     Creat and Lfts stable and tolerating IV Remdesivir ok and Ferritin trending down CXR changes from COVID -19 infection      Labs, Microbiology, Radiology and all the pertinent results from current hospitalization and  care every where were reviewed  by me as a part of the evaluation   Plan:   1. COVID-19 rapid testing is positive +   2.  Resumed IV Remdesivir x 100 mg daily will have to watch Lfts closely started on 8/9and Finish x 4 doses  3. Cont IV Dexamethasone   4. Type and screen done  5. S/p Convalescent plasma pt has consented and forms signed and infusion completed on  8/6  6. Trend D dimer, ferritin, CRP now slow improvement   7. CXR from 8/11 reviewed        Discussed with patient/Family and Nursing d/w Pharmacy        Pt  enrolled in [address] [place] [address] plasma [address] protocol  Date  8/6/20    Patient Code :  [id number]    Risk of Complications/Morbidity: High      · Illness(es)/ Infection present that pose threat to bodily function. · There is potential for severe exacerbation of infection/side effects of treatment. · Therapy requires intensive monitoring for antimicrobial agent toxicity. Discussed with patient/Family and Nursing staff     Thanks for allowing me to participate in your patient's care and please call me with any questions or concerns.     Linda Stauffer MD  Infectious Disease  [facility] ([facility]) Physician  Phone: [phone]   Fax : [phone]

## 2020-08-11 NOTE — PROGRESS NOTES
Hospitalist Progress Note      PCP: Ashly Valenzuela    Date of Admission: 8/4/2020    Chief Complaint: SOB    Hospital Course: Patient is a 67-year-old man with history of hypertension and hyperlipidemia who presents to the emergency room for evaluation of cough and shortness of breath. He states that he was diagnosed with COVID-19 is an outpatient on or about 7/27/2020. Approximately three weeks ago he was on vacation with 25 other people at the [facility] in [place]. Since that time several of his companions have been diagnosed with COVID-19. Over the past several days here developed a cough and worsening shortness of breath. In the emergency room he was found to have an oxygen saturation in the mid 80s on room air. Patient was started on IV Remdesivir - which was stopped due to elevated liver enzymes - resumed - to complete the course. Status post convalescent plasma on 8/6  On dexamethasone. Subjective: When lying on his right side - O2 sats % - turned O2 down to 6l/min. baseline RA      Feels about the same. Not much cough. No chest pain.      Medications:  Reviewed    Infusion Medications    dextrose       Scheduled Medications    insulin lispro  0-12 Units Subcutaneous TID WC    insulin lispro  0-6 Units Subcutaneous Nightly    remdesivir IVPB  100 mg Intravenous Q24H    sodium chloride  20 mL Intravenous Once    QUEtiapine  100 mg Oral BID    [Held by provider] pravastatin  40 mg Oral Nightly    lisinopril  20 mg Oral Daily    lamoTRIgine  150 mg Oral BID    aspirin  81 mg Oral Daily    amLODIPine  5 mg Oral Daily    sodium chloride flush  10 mL Intravenous 2 times per day    enoxaparin  30 mg Subcutaneous BID    dexamethasone  6 mg Oral Daily    potassium chloride  40 mEq Oral Once     PRN Meds: glucose, dextrose, glucagon (rDNA), dextrose, dextromethorphan-guaiFENesin, sodium chloride flush, acetaminophen **OR** acetaminophen, polyethylene glycol, ondansetron, albuterol sulfate HFA, benzonatate      Intake/Output Summary (Last 24 hours) at 8/11/2020 1244  Last data filed at 8/11/2020 0434  Gross per 24 hour   Intake 240 ml   Output 1100 ml   Net -860 ml       Physical Exam Performed:    BP (!) 142/80   Pulse 78   Temp 98.1 °F (36.7 °C) (Oral)   Resp 18   Ht 6' 4\" (1.93 m)   Wt 245 lb 2.4 oz (111.2 kg)   SpO2 91%   BMI 29.84 kg/m²     General appearance: No apparent distress, appears stated age and cooperative. HEENT: Pupils equal, round, and reactive to light. Conjunctivae/corneas clear. Neck: Supple, with full range of motion. No jugular venous distention. Trachea midline. Respiratory:  Normal respiratory effort. Bibasilar rales. On 10 L high flow. Cardiovascular: Regular rate and rhythm with normal S1/S2 without murmurs, rubs or gallops. Abdomen: Soft, non-tender, non-distended with normal bowel sounds. Musculoskeletal: No clubbing, cyanosis or edema bilaterally. Full range of motion without deformity. Skin: Skin color, texture, turgor normal.  No rashes or lesions. Neurologic:  Neurovascularly intact without any focal sensory/motor deficits. Cranial nerves: II-XII intact, grossly non-focal.  Psychiatric: Alert and oriented, thought content appropriate, normal insight  Capillary Refill: Brisk,< 3 seconds   Peripheral Pulses: +2 palpable, equal bilaterally       Labs:   Recent Labs     08/09/20  0717 08/10/20  0716 08/11/20  0723   WBC 12.3* 12.6* 13.4*   HGB 14.6 14.1 14.0   HCT 42.6 41.5 41.3    267 306     Recent Labs     08/09/20  0717 08/10/20  0716 08/11/20  0723    139 140   K 3.9 3.7 4.1    106 107   CO2 24 25 24   BUN 24* 19 18   CREATININE 0.6* 0.6* 0.6*   CALCIUM 7.9* 7.9* 8.0*     Recent Labs     08/09/20  0717 08/10/20  0716 08/11/20  0723   AST 46* 25 22   * 123* 91*   BILITOT 0.7 0.8 0.5   ALKPHOS 92 84 77     No results for input(s): INR in the last 72 hours.   No results for input(s): CKTOTAL, TROPONINI in the last 72 hours. Urinalysis:      Lab Results   Component Value Date    NITRU Negative 08/19/2017    BLOODU Negative 08/19/2017    SPECGRAV >1.030 08/19/2017    GLUCOSEU Negative 08/19/2017       Radiology:  XR CHEST PORTABLE   Final Result   No significant interval change in multifocal bilateral airspace disease as   compared to prior. XR CHEST 1 VIEW   Final Result   Multifocal curvilinear and hazy opacities within the bilateral mid and lower   lung zones, most consistent with multifocal pneumonia, to include atypical   causes. Assessment/Plan:    COVID-19 pneumonia   - Decadron 6 mg daily for 10 days   - Lovenox 30 twice daily  Infectious disease consult    - remdesivir - held due to abnromal LFT - now resumed - end 8/13.    - convalescent plasma 8/6  Inflammatory markers ferritin, ddimer, CRP trending down. Acute respiratory failure with hypoxia - ongoing - slow improvement. Secondary to COVID-19 pneumonia  Down to 6l/min today. Hyperglycemia suspected secondary to IV Decadron. Cont sliding scale. A1C 5.8        Essential (primary) hypertension - continue home meds and monitor blood pressure       Hyperlipidemia - No current evidence of Rhabdomyolysis or other adverse effects. Continue statin therapy while in the hospital      Hx of Bipolar d/o - stable. Cont PTA regimen. DVT Prophylaxis: Lovenox  Diet: Dietary Nutrition Supplements: Standard High Calorie Oral Supplement  DIET GENERAL; Carb Control: 4 carb choices (60 gms)/meal  Code Status: Full Code    PT/OT Eval Status: Not applicable    Dispo - cc.        Nacho Gonzalez MD

## 2020-08-11 NOTE — PLAN OF CARE
Problem: Airway Clearance - Ineffective  Goal: Achieve or maintain patent airway  8/11/2020 0809 by Santa Gotti RN  Outcome: Ongoing  8/11/2020 0048 by Larry Clark RN  Outcome: Ongoing     Problem: Gas Exchange - Impaired  Goal: Absence of hypoxia  8/11/2020 0809 by Santa Gotti RN  Outcome: Ongoing  8/11/2020 0048 by Larry Clark RN  Outcome: Ongoing  Goal: Promote optimal lung function  8/11/2020 0809 by Santa Gotti RN  Outcome: Ongoing  8/11/2020 0048 by Larry Clark RN  Outcome: Ongoing     Problem: Breathing Pattern - Ineffective  Goal: Ability to achieve and maintain a regular respiratory rate  8/11/2020 0809 by Santa Gotti RN  Outcome: Ongoing  8/11/2020 0048 by Larry Clark RN  Outcome: Ongoing     Problem:  Body Temperature -  Risk of, Imbalanced  Goal: Ability to maintain a body temperature within defined limits  8/11/2020 0809 by Santa Gotti RN  Outcome: Ongoing  8/11/2020 0048 by Larry Clark RN  Outcome: Ongoing  Goal: Will regain or maintain usual level of consciousness  8/11/2020 0809 by Santa Gotti RN  Outcome: Ongoing  8/11/2020 0048 by Larry Clark RN  Outcome: Ongoing  Goal: Complications related to the disease process, condition or treatment will be avoided or minimized  8/11/2020 0809 by Santa Gotti RN  Outcome: Ongoing  8/11/2020 0048 by Larry Clark RN  Outcome: Ongoing     Problem: Isolation Precautions - Risk of Spread of Infection  Goal: Prevent transmission of infection  8/11/2020 0809 by Santa Gotti RN  Outcome: Ongoing  8/11/2020 0048 by Larry Clark RN  Outcome: Ongoing     Problem: Nutrition Deficits  Goal: Optimize nutrtional status  8/11/2020 0809 by Santa Gotti RN  Outcome: Ongoing  8/11/2020 0048 by Larry Clark RN  Outcome: Ongoing     Problem: Risk for Fluid Volume Deficit  Goal: Maintain normal heart rhythm  8/11/2020 0809 by Santa Gotti RN  Outcome: Ongoing  8/11/2020 0048 by Larry Clark RN  Outcome: Ongoing  Goal: Maintain absence of muscle cramping  8/11/2020 0809 by Jordana Dai RN  Outcome: Ongoing  8/11/2020 0048 by Pebbles Briceno RN  Outcome: Ongoing  Goal: Maintain normal serum potassium, sodium, calcium, phosphorus, and pH  8/11/2020 0809 by Jordana Dai RN  Outcome: Ongoing  8/11/2020 0048 by Pebbles Briceno RN  Outcome: Ongoing     Problem: Loneliness or Risk for Loneliness  Goal: Demonstrate positive use of time alone when socialization is not possible  8/11/2020 0809 by Jordana Dai RN  Outcome: Ongoing  8/11/2020 0048 by Pebbles Briceno RN  Outcome: Ongoing     Problem: Fatigue  Goal: Verbalize increase energy and improved vitality  8/11/2020 0809 by Jordana Dai RN  Outcome: Ongoing  8/11/2020 0048 by Pebbles Briceno RN  Outcome: Ongoing     Problem: Patient Education: Go to Patient Education Activity  Goal: Patient/Family Education  8/11/2020 0809 by Jordana Dai RN  Outcome: Ongoing  8/11/2020 0048 by Pebbles Briceno RN  Outcome: Ongoing     Problem: Falls - Risk of:  Goal: Will remain free from falls  Description: Will remain free from falls  8/11/2020 0809 by Jordana Dai RN  Outcome: Ongoing  8/11/2020 0048 by Pebbles Briceno RN  Outcome: Ongoing  Goal: Absence of physical injury  Description: Absence of physical injury  8/11/2020 0809 by Jordana Dai RN  Outcome: Ongoing  8/11/2020 0048 by Pebbles Briceno RN  Outcome: Ongoing

## 2020-08-11 NOTE — PROGRESS NOTES
Nutrition Assessment     Type and Reason for Visit: Reassess    Nutrition Recommendations/Plan:   Continue Carb control diet. D/C supplement. Nutrition Assessment:  Pt with improvement from a nutrition standpoint. Pt consuming >75% of meals during admission. Not favorable of supplement, will D/C. No nutrition interventions at this time.     Malnutrition Assessment:  Malnutrition Status: Insufficient data    Nutrition Related Findings: BM 8/9      Current Nutrition Therapies:    Dietary Nutrition Supplements: Standard High Calorie Oral Supplement  DIET GENERAL; Carb Control: 4 carb choices (60 gms)/meal    Anthropometric Measures:  · Height: 6' 4\" (193 cm)  · Current Body Wt: 245 lb (111.1 kg)   · BMI: 29.8    Nutrition Diagnosis:   · Inadequate oral intake(improved) related to (acute illness) as evidenced by poor intake prior to admission      Nutrition Interventions:   Food and/or Nutrient Delivery:  Continue Current Diet, Discontinue Oral Nutrition Supplement  Nutrition Education/Counseling:  No recommendation at this time   Coordination of Nutrition Care:  Continued Inpatient Monitoring      Electronically signed by Harry Morrell RD, LD on 8/11/20 at 1:48 PM EDT    Contact: [phone]

## 2020-08-12 LAB
A/G RATIO: 1.2 (ref 1.1–2.2)
ALBUMIN SERPL-MCNC: 2.9 G/DL (ref 3.4–5)
ALP BLD-CCNC: 74 U/L (ref 40–129)
ALT SERPL-CCNC: 79 U/L (ref 10–40)
ANION GAP SERPL CALCULATED.3IONS-SCNC: 7 MMOL/L (ref 3–16)
AST SERPL-CCNC: 20 U/L (ref 15–37)
BASOPHILS ABSOLUTE: 0 K/UL (ref 0–0.2)
BASOPHILS RELATIVE PERCENT: 0 %
BILIRUB SERPL-MCNC: 0.5 MG/DL (ref 0–1)
BUN BLDV-MCNC: 20 MG/DL (ref 7–20)
CALCIUM SERPL-MCNC: 8 MG/DL (ref 8.3–10.6)
CHLORIDE BLD-SCNC: 107 MMOL/L (ref 99–110)
CO2: 27 MMOL/L (ref 21–32)
CREAT SERPL-MCNC: 0.7 MG/DL (ref 0.8–1.3)
D DIMER: 605 NG/ML DDU (ref 0–229)
EOSINOPHILS ABSOLUTE: 0 K/UL (ref 0–0.6)
EOSINOPHILS RELATIVE PERCENT: 0 %
GFR AFRICAN AMERICAN: >60
GFR NON-AFRICAN AMERICAN: >60
GLOBULIN: 2.4 G/DL
GLUCOSE BLD-MCNC: 128 MG/DL (ref 70–99)
GLUCOSE BLD-MCNC: 137 MG/DL (ref 70–99)
GLUCOSE BLD-MCNC: 138 MG/DL (ref 70–99)
GLUCOSE BLD-MCNC: 218 MG/DL (ref 70–99)
GLUCOSE BLD-MCNC: 258 MG/DL (ref 70–99)
HCT VFR BLD CALC: 41.9 % (ref 40.5–52.5)
HEMOGLOBIN: 14.4 G/DL (ref 13.5–17.5)
LYMPHOCYTES ABSOLUTE: 0.6 K/UL (ref 1–5.1)
LYMPHOCYTES RELATIVE PERCENT: 4 %
MCH RBC QN AUTO: 29.3 PG (ref 26–34)
MCHC RBC AUTO-ENTMCNC: 34.4 G/DL (ref 31–36)
MCV RBC AUTO: 85.3 FL (ref 80–100)
METAMYELOCYTES RELATIVE PERCENT: 1 %
MONOCYTES ABSOLUTE: 0.9 K/UL (ref 0–1.3)
MONOCYTES RELATIVE PERCENT: 6 %
MYELOCYTE PERCENT: 3 %
NEUTROPHILS ABSOLUTE: 13.3 K/UL (ref 1.7–7.7)
NEUTROPHILS RELATIVE PERCENT: 86 %
PDW BLD-RTO: 13.1 % (ref 12.4–15.4)
PERFORMED ON: ABNORMAL
PLATELET # BLD: 284 K/UL (ref 135–450)
PLATELET SLIDE REVIEW: ADEQUATE
PMV BLD AUTO: 7.5 FL (ref 5–10.5)
POTASSIUM REFLEX MAGNESIUM: 4.2 MMOL/L (ref 3.5–5.1)
PROCALCITONIN: 0.09 NG/ML (ref 0–0.15)
RBC # BLD: 4.91 M/UL (ref 4.2–5.9)
RBC # BLD: NORMAL 10*6/UL
SLIDE REVIEW: ABNORMAL
SODIUM BLD-SCNC: 141 MMOL/L (ref 136–145)
TOTAL PROTEIN: 5.3 G/DL (ref 6.4–8.2)
TOXIC GRANULATION: PRESENT
VACUOLATED NEUTROPHILS: PRESENT
WBC # BLD: 14.8 K/UL (ref 4–11)

## 2020-08-12 PROCEDURE — 6370000000 HC RX 637 (ALT 250 FOR IP): Performed by: INTERNAL MEDICINE

## 2020-08-12 PROCEDURE — 2700000000 HC OXYGEN THERAPY PER DAY

## 2020-08-12 PROCEDURE — 94761 N-INVAS EAR/PLS OXIMETRY MLT: CPT

## 2020-08-12 PROCEDURE — 6360000002 HC RX W HCPCS: Performed by: INTERNAL MEDICINE

## 2020-08-12 PROCEDURE — 2580000003 HC RX 258: Performed by: INTERNAL MEDICINE

## 2020-08-12 PROCEDURE — 36415 COLL VENOUS BLD VENIPUNCTURE: CPT

## 2020-08-12 PROCEDURE — 99232 SBSQ HOSP IP/OBS MODERATE 35: CPT | Performed by: INTERNAL MEDICINE

## 2020-08-12 PROCEDURE — 85025 COMPLETE CBC W/AUTO DIFF WBC: CPT

## 2020-08-12 PROCEDURE — 80053 COMPREHEN METABOLIC PANEL: CPT

## 2020-08-12 PROCEDURE — 84145 PROCALCITONIN (PCT): CPT

## 2020-08-12 PROCEDURE — 2500000003 HC RX 250 WO HCPCS: Performed by: INTERNAL MEDICINE

## 2020-08-12 PROCEDURE — 85379 FIBRIN DEGRADATION QUANT: CPT

## 2020-08-12 PROCEDURE — 1200000000 HC SEMI PRIVATE

## 2020-08-12 RX ADMIN — SODIUM CHLORIDE, PRESERVATIVE FREE 10 ML: 5 INJECTION INTRAVENOUS at 08:08

## 2020-08-12 RX ADMIN — LAMOTRIGINE 150 MG: 100 TABLET ORAL at 21:03

## 2020-08-12 RX ADMIN — SODIUM CHLORIDE, PRESERVATIVE FREE 10 ML: 5 INJECTION INTRAVENOUS at 21:04

## 2020-08-12 RX ADMIN — AMLODIPINE BESYLATE 5 MG: 5 TABLET ORAL at 08:07

## 2020-08-12 RX ADMIN — INSULIN LISPRO 4 UNITS: 100 INJECTION, SOLUTION INTRAVENOUS; SUBCUTANEOUS at 17:12

## 2020-08-12 RX ADMIN — QUETIAPINE FUMARATE 100 MG: 100 TABLET ORAL at 21:03

## 2020-08-12 RX ADMIN — ENOXAPARIN SODIUM 30 MG: 40 INJECTION SUBCUTANEOUS at 21:03

## 2020-08-12 RX ADMIN — ASPIRIN 81 MG: 81 TABLET, COATED ORAL at 08:07

## 2020-08-12 RX ADMIN — REMDESIVIR 100 MG: 100 INJECTION, POWDER, LYOPHILIZED, FOR SOLUTION INTRAVENOUS at 17:05

## 2020-08-12 RX ADMIN — INSULIN LISPRO 3 UNITS: 100 INJECTION, SOLUTION INTRAVENOUS; SUBCUTANEOUS at 21:04

## 2020-08-12 RX ADMIN — ENOXAPARIN SODIUM 30 MG: 40 INJECTION SUBCUTANEOUS at 08:07

## 2020-08-12 RX ADMIN — QUETIAPINE FUMARATE 100 MG: 100 TABLET ORAL at 08:07

## 2020-08-12 RX ADMIN — LAMOTRIGINE 150 MG: 100 TABLET ORAL at 08:06

## 2020-08-12 RX ADMIN — DEXAMETHASONE 6 MG: 6 TABLET ORAL at 08:07

## 2020-08-12 RX ADMIN — LISINOPRIL 20 MG: 20 TABLET ORAL at 08:07

## 2020-08-12 ASSESSMENT — PAIN SCALES - GENERAL
PAINLEVEL_OUTOF10: 0
PAINLEVEL_OUTOF10: 0

## 2020-08-12 NOTE — PLAN OF CARE
Problem: Airway Clearance - Ineffective  Goal: Achieve or maintain patent airway  8/12/2020 0914 by Katie Dhillon RN  Outcome: Ongoing     Problem: Gas Exchange - Impaired  Goal: Absence of hypoxia  8/12/2020 0914 by Katie Dhillon RN  Outcome: Ongoing     Problem: Breathing Pattern - Ineffective  Goal: Ability to achieve and maintain a regular respiratory rate  8/12/2020 0914 by Katie Dhillon RN  Outcome: Ongoing     Problem:  Body Temperature -  Risk of, Imbalanced  Goal: Ability to maintain a body temperature within defined limits  8/12/2020 0914 by Katie Dhillon RN  Outcome: Ongoing     Problem: Isolation Precautions - Risk of Spread of Infection  Goal: Prevent transmission of infection  8/12/2020 0914 by Katie Dhillon RN  Outcome: Ongoing     Problem: Nutrition Deficits  Goal: Optimize nutrtional status  8/12/2020 0914 by Katie Dhillon RN  Outcome: Ongoing     Problem: Risk for Fluid Volume Deficit  Goal: Maintain normal heart rhythm  8/12/2020 0914 by Katie Dhillon RN  Outcome: Ongoing     Problem: Loneliness or Risk for Loneliness  Goal: Demonstrate positive use of time alone when socialization is not possible  8/12/2020 0914 by Katie Dhillon RN  Outcome: Ongoing     Problem: Fatigue  Goal: Verbalize increase energy and improved vitality  8/12/2020 0914 by Katie Dhillon RN  Outcome: Ongoing     Problem: Patient Education: Go to Patient Education Activity  Goal: Patient/Family Education  8/12/2020 0914 by Katie Dhillon RN  Outcome: Ongoing     Problem: Falls - Risk of:  Goal: Will remain free from falls  Description: Will remain free from falls  8/12/2020 0914 by Katie Dhillon RN  Outcome: Ongoing     Problem: Discharge Planning:  Goal: Discharged to appropriate level of care  Description: Discharged to appropriate level of care  8/12/2020 0914 by Katie Dhillon RN  Outcome: Ongoing

## 2020-08-12 NOTE — PROGRESS NOTES
Patient is alert and oriented x4, UAL, call light within reach, no alarms noted patient UAL. AM meds complete, patient tolerated well. VSS and WDL. No s/s of distress, no further needs noted at this time.  Electronically signed by Jose Luis England RN on 8/12/2020 at 9:16 AM

## 2020-08-12 NOTE — PROGRESS NOTES
Infectious Disease Follow up Notes  Admit Date: 8/4/2020  Hospital Day: 9    Antibiotics :   Dexamethasone  IV Remdesivir resumed on 8/9   S/p Convalescent plasma on 8/6     CHIEF COMPLAINT:     COVID-19 Pneumonia  Fevers  Sob    Subjective interval History :  60 y.o. man admitted on 8/4 with fevers, sob, cough was diagnosed with COVID -19 infection as  Outpatient and we cannot see the results in Epic or in care every where but apparently had other people with him tested positive for COVID-19 per HPI as pt was recently out on vacation. He has HTN, AND hyperlipidemia. Fevers at home now in hospital at 99.5 and  Elevation in Lfts, creat normal , WBC normal with Lymphopenia.  CXR with bi lateral PNA noted and he is on  9 lts nasal cannula as his Oxygen requirements are going up we are consulted for recommendations      Remains on 6lts nasal cannula and some improvement and Procal normal and creat stable - Lfts stable tolerating the meds ok     Past Medical History:    Past Medical History:   Diagnosis Date    GERD (gastroesophageal reflux disease)     Hyperlipidemia     Hypertension     Kidney stone        Past Surgical History:    Past Surgical History:   Procedure Laterality Date    CHOLECYSTECTOMY  08/19/2017    Lap    COLONOSCOPY  10/16/2015    CYSTOSCOPY      KIDNEY STONES    ENDOSCOPY, COLON, DIAGNOSTIC  08/08/2017    UPPER GASTROINTESTINAL ENDOSCOPY  07/14/2017       Current Medications:    Outpatient Medications Marked as Taking for the 8/4/20 encounter [facility] Encounter)   Medication Sig Dispense Refill    lisinopril (PRINIVIL;ZESTRIL) 20 MG tablet Take 20 mg by mouth daily      amLODIPine (NORVASC) 5 MG tablet Take 5 mg by mouth daily      pravastatin (PRAVACHOL) 40 MG tablet Take 40 mg by mouth nightly       lamoTRIgine (LAMICTAL) 150 MG tablet Take 150 mg by mouth 2 times daily      QUEtiapine (SEROQUEL) 100 MG tablet Take 100 mg by mouth 2 times daily      aspirin 81 MG EC tablet Take 81 mg by mouth daily         Allergies:  Bee pollen    Immunizations : There is no immunization history on file for this patient. Social History:     Social History     Tobacco Use    Smoking status: Never Smoker    Smokeless tobacco: Never Used   Substance Use Topics    Alcohol use: Yes     Comment: occ    Drug use: No     Social History     Tobacco Use   Smoking Status Never Smoker   Smokeless Tobacco Never Used      Family History   Problem Relation Age of Onset    Heart Disease Mother     Stroke Mother     Cancer Father         STOMACH      REVIEW OF SYSTEMS:    No fever / chills / sweats. No weight loss. No visual change, eye pain, eye discharge. No oral lesion, sore throat, dysphagia. Denies cough / sputum/Sob   Denies chest pain, palpitations/ dizziness  Denies nausea/ vomiting/abdominal pain/diarrhea. Denies dysuria or change in urinary function. Denies joint swelling or pain. No myalgia, arthralgia. No rashes, skin lesions   Denies focal weakness, sensory change or other neurologic symptoms  No lymph node swelling or tenderness. Fevers, sob+     PHYSICAL EXAM:      Vitals:    /70   Pulse 78   Temp 97.8 °F (36.6 °C) (Oral)   Resp 16   Ht 6' 4\" (1.93 m)   Wt 245 lb 2.4 oz (111.2 kg)   SpO2 92%   BMI 29.84 kg/m²     In-person bedside physical examination deferred.   Pursuant to the emergency declaration under the [facility], [facility] waiver authority and the [employer] Act, this clinical encounter was conducted to provide necessary medical care.   (Also consistent with new provisions and guidance offered by [facility] on March 18, 2020 in setting of COVID 19 outbreak and in order to preserve personal protective equipment in accordance with the flexibilities announced by CMS on March 30, 2020) References: https://[facility]. [facility]/Portals/0/Resources/COVID-19/3_18%20Telemed%20Guidance%20Updated%20March%2018. pdf?[email or web address]                      https://[facility]/Portals/0/Resources/COVID-19/3_18%20Telemed%20Guidance%20Updated%20March%2018. pdf?qkv=[email or web address]                      [email or web address] pdf                                  Pulmonary: deferred  Abdomen/GI: deferred  Neuro: deferred  Skin: deferred  Musculoskeletal:  deferred  Genitourinary: Deferred  Psych: deferred  Lymphatic/Immunologic: deferred  Data Review:    Lab Results   Component Value Date    WBC 14.8 (H) 08/12/2020    HGB 14.4 08/12/2020    HCT 41.9 08/12/2020    MCV 85.3 08/12/2020     08/12/2020     Lab Results   Component Value Date    CREATININE 0.7 (L) 08/12/2020    BUN 20 08/12/2020     08/12/2020    K 4.2 08/12/2020     08/12/2020    CO2 27 08/12/2020       Hepatic Function Panel:   Lab Results   Component Value Date    ALKPHOS 74 08/12/2020    ALT 79 08/12/2020    AST 20 08/12/2020    PROT 5.3 08/12/2020    BILITOT 0.5 08/12/2020    LABALBU 2.9 08/12/2020       UA:  Lab Results   Component Value Date    COLORU YELLOW 08/19/2017    CLARITYU Clear 08/19/2017    GLUCOSEU Negative 08/19/2017    BILIRUBINUR Negative 08/19/2017    KETUA 40 08/19/2017    SPECGRAV >1.030 08/19/2017    BLOODU Negative 08/19/2017    PHUR 7.5 08/19/2017    PROTEINU Negative 08/19/2017    UROBILINOGEN 1.0 08/19/2017    NITRU Negative 08/19/2017    LEUKOCYTESUR Negative 08/19/2017    LABMICR Not Indicated 08/19/2017    URINETYPE Not Specified 08/19/2017      Urine Microscopic: No results found for: LABCAST, BACTERIA, COMU, HYALCAST, WBCUA, RBCUA, EPIU     Lactic acid  1.5      Ferritin  3295  Up 5636        D Dimer   345     AST  140  UP  223   ALT  189  UP  209       crp  50.5       MICRO: cultures reviewed and updated by me            Culture, Blood 2 [[id number]]   Collected: 08/04/20 1418    Order Status: Completed  Specimen: Blood  Updated: 08/05/20 1515     Culture, Blood 2  No Growth to date.  Any change in status will be called. Narrative:      ORDER#: [id number]                          ORDERED BY: Kimmie Holder   SOURCE: Blood                              COLLECTED:  08/04/20 14:18   ANTIBIOTICS AT [facility] :                      RECEIVED :  08/04/20 14:28   If child <=2 yrs old please draw pediatric bottle. ~Blood Culture #2   Performed at:   [facility]   [address], [place] [employer] [address]   Phone [phone]    Culture, Blood 1 [[id number]]   Collected: 08/04/20 1418    Order Status: Completed  Specimen: Blood  Updated: 08/05/20 1515     Blood Culture, Routine  No Growth to date.  Any change in status will be called. Narrative:      ORDER#: [id number]                          ORDERED BY: Kimmie Holder   SOURCE: Blood                              COLLECTED:  08/04/20 14:18   ANTIBIOTICS AT [facility] :                      RECEIVED :  08/04/20 14:28   If child <=2 yrs old please draw pediatric bottle. ~Blood Culture #1   Performed at:   [facility]   [address] [employer] [address]   Phone [phone]          IMAGING:    XR CHEST PORTABLE   Final Result   No significant interval change in multifocal bilateral airspace disease as   compared to prior. XR CHEST 1 VIEW   Final Result   Multifocal curvilinear and hazy opacities within the bilateral mid and lower   lung zones, most consistent with multifocal pneumonia, to include atypical   causes.                All the pertinent images and reports for the current Hospitalization were reviewed by me     Scheduled Meds:   insulin lispro  0-12 Units Subcutaneous TID     insulin lispro  0-6 Units Subcutaneous Nightly    remdesivir IVPB  100 mg Intravenous Q24H    sodium chloride  20 mL Intravenous Once    and  care every where were reviewed  by me as a part of the evaluation   Plan:   1. COVID-19 rapid testing is positive +   2. Resumed  IV Remdesivir x 100 mg daily will have to watch Lfts closely started on 8/9 and Finish x 4 doses  3. Cont IV Dexamethasone   4. Type and screen done  5. S/p Convalescent plasma pt has consented and forms signed and infusion completed on  8/6  6. Trend D dimer, ferritin, CRP   7. CXR from 8/11 reviewed   8. Hopefully resp status improves and can wean off the O2        Discussed with patient/Family and Nursing d/w Pharmacy        Pt  enrolled in [address] [place] [address] plasma program [address] [place] [address] protocol  Date  8/6/20    Patient Code :  [id number]    Risk of Complications/Morbidity: High      · Illness(es)/ Infection present that pose threat to bodily function. · There is potential for severe exacerbation of infection/side effects of treatment. · Therapy requires intensive monitoring for antimicrobial agent toxicity. Discussed with patient/Family and Nursing staff     Thanks for allowing me to participate in your patient's care and please call me with any questions or concerns.     Charlotte Awad MD  Infectious Disease  [facility] ([facility]) Physician  Phone: [phone]   Fax : [phone]

## 2020-08-12 NOTE — PROGRESS NOTES
Hospitalist Progress Note      PCP: Merlene Gustasfon    Date of Admission: 8/4/2020    Chief Complaint: SOB    Hospital Course: Patient is a 27-year-old man with history of hypertension and hyperlipidemia who presents to the emergency room for evaluation of cough and shortness of breath. He states that he was diagnosed with COVID-19 outpatient on or about 7/27/2020. Approximately three weeks ago he was on vacation with 25 other people at the [facility] in [place]. Since that time several of his companions have been diagnosed with COVID-19. Over the past several days here developed a cough and worsening shortness of breath. In the emergency room he was found to have an oxygen saturation in the mid 80s on room air. Patient was started on IV Remdesivir - which was stopped due to elevated liver enzymes - resumed - to complete the course. Status post convalescent plasma on 8/6    On dexamethasone. Subjective:     Oxygenation continues better. Weaned down to 2l/min - not sure if will sustain - monitor closely. Sats staying 90-94% on this setting now. Feels better overall.          Medications:  Reviewed    Infusion Medications    dextrose       Scheduled Medications    insulin lispro  0-12 Units Subcutaneous TID WC    insulin lispro  0-6 Units Subcutaneous Nightly    remdesivir IVPB  100 mg Intravenous Q24H    sodium chloride  20 mL Intravenous Once    QUEtiapine  100 mg Oral BID    [Held by provider] pravastatin  40 mg Oral Nightly    lisinopril  20 mg Oral Daily    lamoTRIgine  150 mg Oral BID    aspirin  81 mg Oral Daily    amLODIPine  5 mg Oral Daily    sodium chloride flush  10 mL Intravenous 2 times per day    enoxaparin  30 mg Subcutaneous BID    dexamethasone  6 mg Oral Daily    potassium chloride  40 mEq Oral Once     PRN Meds: glucose, dextrose, glucagon (rDNA), dextrose, dextromethorphan-guaiFENesin, sodium chloride flush, acetaminophen **OR** acetaminophen, Joanna Lujan in the last 72 hours. Urinalysis:      Lab Results   Component Value Date    NITRU Negative 08/19/2017    BLOODU Negative 08/19/2017    SPECGRAV >1.030 08/19/2017    GLUCOSEU Negative 08/19/2017       Radiology:  XR CHEST PORTABLE   Final Result   No significant interval change in multifocal bilateral airspace disease as   compared to prior. XR CHEST 1 VIEW   Final Result   Multifocal curvilinear and hazy opacities within the bilateral mid and lower   lung zones, most consistent with multifocal pneumonia, to include atypical   causes. Assessment/Plan:    COVID-19 pneumonia   - Decadron 6 mg daily for 10 days   - Lovenox 30 twice daily  Infectious disease consult    - remdesivir - held due to abnromal LFT - now resumed - scheduled to end 8/13.    - convalescent plasma 8/6  Inflammatory markers ferritin, ddimer, CRP trending down. Acute respiratory failure with hypoxia - ongoing - slow improvement. Secondary to COVID-19 pneumonia  Down to 6l/min today - down to 2l/min for a short period of time. Hyperglycemia suspected secondary to IV Decadron. Cont sliding scale. A1C 5.8        Essential (primary) hypertension - continue home meds and monitor blood pressure       Hyperlipidemia - No current evidence of Rhabdomyolysis or other adverse effects. Continue statin therapy while in the hospital      Hx of Bipolar d/o - stable. Cont PTA regimen. DVT Prophylaxis: Lovenox  Diet: DIET GENERAL; Carb Control: 4 carb choices (60 gms)/meal  Code Status: Full Code    PT/OT Eval Status: Not applicable    Dispo - cc.        Becky Reyes MD

## 2020-08-13 LAB
A/G RATIO: 1.2 (ref 1.1–2.2)
ALBUMIN SERPL-MCNC: 3 G/DL (ref 3.4–5)
ALP BLD-CCNC: 73 U/L (ref 40–129)
ALT SERPL-CCNC: 69 U/L (ref 10–40)
ANION GAP SERPL CALCULATED.3IONS-SCNC: 10 MMOL/L (ref 3–16)
AST SERPL-CCNC: 24 U/L (ref 15–37)
BANDED NEUTROPHILS RELATIVE PERCENT: 2 % (ref 0–7)
BASOPHILS ABSOLUTE: 0 K/UL (ref 0–0.2)
BASOPHILS RELATIVE PERCENT: 0 %
BILIRUB SERPL-MCNC: 0.5 MG/DL (ref 0–1)
BUN BLDV-MCNC: 18 MG/DL (ref 7–20)
CALCIUM SERPL-MCNC: 8.3 MG/DL (ref 8.3–10.6)
CHLORIDE BLD-SCNC: 108 MMOL/L (ref 99–110)
CO2: 24 MMOL/L (ref 21–32)
CREAT SERPL-MCNC: 0.7 MG/DL (ref 0.8–1.3)
D DIMER: 561 NG/ML DDU (ref 0–229)
EOSINOPHILS ABSOLUTE: 0 K/UL (ref 0–0.6)
EOSINOPHILS RELATIVE PERCENT: 0 %
GFR AFRICAN AMERICAN: >60
GFR NON-AFRICAN AMERICAN: >60
GLOBULIN: 2.5 G/DL
GLUCOSE BLD-MCNC: 102 MG/DL (ref 70–99)
GLUCOSE BLD-MCNC: 106 MG/DL (ref 70–99)
GLUCOSE BLD-MCNC: 124 MG/DL (ref 70–99)
GLUCOSE BLD-MCNC: 178 MG/DL (ref 70–99)
GLUCOSE BLD-MCNC: 229 MG/DL (ref 70–99)
HCT VFR BLD CALC: 43.5 % (ref 40.5–52.5)
HEMOGLOBIN: 14.8 G/DL (ref 13.5–17.5)
LYMPHOCYTES ABSOLUTE: 0.7 K/UL (ref 1–5.1)
LYMPHOCYTES RELATIVE PERCENT: 5 %
MCH RBC QN AUTO: 29.2 PG (ref 26–34)
MCHC RBC AUTO-ENTMCNC: 34.1 G/DL (ref 31–36)
MCV RBC AUTO: 85.7 FL (ref 80–100)
METAMYELOCYTES RELATIVE PERCENT: 2 %
MONOCYTES ABSOLUTE: 1.1 K/UL (ref 0–1.3)
MONOCYTES RELATIVE PERCENT: 8 %
MYELOCYTE PERCENT: 4 %
NEUTROPHILS ABSOLUTE: 11.7 K/UL (ref 1.7–7.7)
NEUTROPHILS RELATIVE PERCENT: 79 %
PDW BLD-RTO: 13.3 % (ref 12.4–15.4)
PERFORMED ON: ABNORMAL
PLATELET # BLD: 270 K/UL (ref 135–450)
PMV BLD AUTO: 7.5 FL (ref 5–10.5)
POTASSIUM REFLEX MAGNESIUM: 4 MMOL/L (ref 3.5–5.1)
RBC # BLD: 5.08 M/UL (ref 4.2–5.9)
RBC # BLD: NORMAL 10*6/UL
SODIUM BLD-SCNC: 142 MMOL/L (ref 136–145)
TOTAL PROTEIN: 5.5 G/DL (ref 6.4–8.2)
WBC # BLD: 13.4 K/UL (ref 4–11)

## 2020-08-13 PROCEDURE — 1200000000 HC SEMI PRIVATE

## 2020-08-13 PROCEDURE — 6370000000 HC RX 637 (ALT 250 FOR IP): Performed by: INTERNAL MEDICINE

## 2020-08-13 PROCEDURE — 2580000003 HC RX 258: Performed by: INTERNAL MEDICINE

## 2020-08-13 PROCEDURE — 99232 SBSQ HOSP IP/OBS MODERATE 35: CPT | Performed by: INTERNAL MEDICINE

## 2020-08-13 PROCEDURE — 6360000002 HC RX W HCPCS: Performed by: INTERNAL MEDICINE

## 2020-08-13 PROCEDURE — 85379 FIBRIN DEGRADATION QUANT: CPT

## 2020-08-13 PROCEDURE — 85025 COMPLETE CBC W/AUTO DIFF WBC: CPT

## 2020-08-13 PROCEDURE — 80053 COMPREHEN METABOLIC PANEL: CPT

## 2020-08-13 RX ADMIN — QUETIAPINE FUMARATE 100 MG: 100 TABLET ORAL at 22:14

## 2020-08-13 RX ADMIN — SODIUM CHLORIDE, PRESERVATIVE FREE 10 ML: 5 INJECTION INTRAVENOUS at 22:17

## 2020-08-13 RX ADMIN — INSULIN LISPRO 4 UNITS: 100 INJECTION, SOLUTION INTRAVENOUS; SUBCUTANEOUS at 17:31

## 2020-08-13 RX ADMIN — INSULIN LISPRO 1 UNITS: 100 INJECTION, SOLUTION INTRAVENOUS; SUBCUTANEOUS at 22:23

## 2020-08-13 RX ADMIN — ENOXAPARIN SODIUM 30 MG: 40 INJECTION SUBCUTANEOUS at 22:12

## 2020-08-13 RX ADMIN — ASPIRIN 81 MG: 81 TABLET, COATED ORAL at 09:52

## 2020-08-13 RX ADMIN — ENOXAPARIN SODIUM 30 MG: 40 INJECTION SUBCUTANEOUS at 09:53

## 2020-08-13 RX ADMIN — DEXAMETHASONE 6 MG: 6 TABLET ORAL at 09:52

## 2020-08-13 RX ADMIN — LISINOPRIL 20 MG: 20 TABLET ORAL at 09:52

## 2020-08-13 RX ADMIN — AMLODIPINE BESYLATE 5 MG: 5 TABLET ORAL at 09:53

## 2020-08-13 RX ADMIN — GUAIFENESIN AND DEXTROMETHORPHAN HYDROBROMIDE 1 TABLET: 600; 30 TABLET, EXTENDED RELEASE ORAL at 22:31

## 2020-08-13 RX ADMIN — LAMOTRIGINE 150 MG: 100 TABLET ORAL at 09:52

## 2020-08-13 RX ADMIN — QUETIAPINE FUMARATE 100 MG: 100 TABLET ORAL at 09:53

## 2020-08-13 RX ADMIN — LAMOTRIGINE 150 MG: 100 TABLET ORAL at 22:12

## 2020-08-13 RX ADMIN — SODIUM CHLORIDE, PRESERVATIVE FREE 10 ML: 5 INJECTION INTRAVENOUS at 09:55

## 2020-08-13 ASSESSMENT — PAIN SCALES - GENERAL: PAINLEVEL_OUTOF10: 0

## 2020-08-13 NOTE — PROGRESS NOTES
Hospitalist Progress Note      PCP: Diann Coleman    Date of Admission: 8/4/2020    Chief Complaint: SOB    Hospital Course: Patient is a 67-year-old man with history of hypertension and hyperlipidemia who presents to the emergency room for evaluation of cough and shortness of breath. He states that he was diagnosed with COVID-19 outpatient on or about 7/27/2020. Approximately three weeks ago he was on vacation with 25 other people at the [facility] in [place]. Since that time several of his companions have been diagnosed with COVID-19. Over the past several days here developed a cough and worsening shortness of breath. In the emergency room he was found to have an oxygen saturation in the mid 80s on room air. Patient was started on IV Remdesivir - which was stopped due to elevated liver enzymes - resumed - to complete the course. Status post convalescent plasma on 8/6    On dexamethasone. Subjective:     Oxygenation now 2-4l/min andoverall much improved. Feels stronger. Cough resolving. Able to \"take a deep breath\".           Medications:  Reviewed    Infusion Medications    dextrose       Scheduled Medications    insulin lispro  0-12 Units Subcutaneous TID WC    insulin lispro  0-6 Units Subcutaneous Nightly    remdesivir IVPB  100 mg Intravenous Q24H    QUEtiapine  100 mg Oral BID    [Held by provider] pravastatin  40 mg Oral Nightly    lisinopril  20 mg Oral Daily    lamoTRIgine  150 mg Oral BID    aspirin  81 mg Oral Daily    amLODIPine  5 mg Oral Daily    sodium chloride flush  10 mL Intravenous 2 times per day    enoxaparin  30 mg Subcutaneous BID    dexamethasone  6 mg Oral Daily    potassium chloride  40 mEq Oral Once     PRN Meds: glucose, dextrose, glucagon (rDNA), dextrose, dextromethorphan-guaiFENesin, sodium chloride flush, acetaminophen **OR** acetaminophen, polyethylene glycol, ondansetron, albuterol sulfate HFA, benzonatate      Intake/Output Summary (Last 24 hours) at 8/13/2020 1259  Last data filed at 8/12/2020 2015  Gross per 24 hour   Intake --   Output 450 ml   Net -450 ml       Physical Exam Performed:    /65   Pulse 85   Temp 98.5 °F (36.9 °C) (Oral)   Resp 17   Ht 6' 4\" (1.93 m)   Wt 245 lb 2.4 oz (111.2 kg)   SpO2 93%   BMI 29.84 kg/m²     General appearance: No apparent distress, appears stated age and cooperative. HEENT: Pupils equal, round, and reactive to light. Conjunctivae/corneas clear. Neck: Supple, with full range of motion. No jugular venous distention. Trachea midline. Respiratory:  Normal respiratory effort. Bibasilar rales. On 10 L high flow. Cardiovascular: Regular rate and rhythm with normal S1/S2 without murmurs, rubs or gallops. Abdomen: Soft, non-tender, non-distended with normal bowel sounds. Musculoskeletal: No clubbing, cyanosis or edema bilaterally. Full range of motion without deformity. Skin: Skin color, texture, turgor normal.  No rashes or lesions. Neurologic:  Neurovascularly intact without any focal sensory/motor deficits. Cranial nerves: II-XII intact, grossly non-focal.  Psychiatric: Alert and oriented, thought content appropriate, normal insight  Capillary Refill: Brisk,< 3 seconds   Peripheral Pulses: +2 palpable, equal bilaterally       Labs:   Recent Labs     08/11/20  0723 08/12/20  0745 08/13/20  0718   WBC 13.4* 14.8* 13.4*   HGB 14.0 14.4 14.8   HCT 41.3 41.9 43.5    284 270     Recent Labs     08/11/20  0723 08/12/20  0745 08/13/20  0718    141 142   K 4.1 4.2 4.0    107 108   CO2 24 27 24   BUN 18 20 18   CREATININE 0.6* 0.7* 0.7*   CALCIUM 8.0* 8.0* 8.3     Recent Labs     08/11/20  0723 08/12/20  0745 08/13/20  0718   AST 22 20 24   ALT 91* 79* 69*   BILITOT 0.5 0.5 0.5   ALKPHOS 77 74 73     No results for input(s): INR in the last 72 hours. No results for input(s): Aneesh Lust in the last 72 hours.     Urinalysis:      Lab Results   Component Value Date NITRU Negative 08/19/2017    BLOODU Negative 08/19/2017    SPECGRAV >1.030 08/19/2017    GLUCOSEU Negative 08/19/2017       Radiology:  XR CHEST PORTABLE   Final Result   No significant interval change in multifocal bilateral airspace disease as   compared to prior. XR CHEST 1 VIEW   Final Result   Multifocal curvilinear and hazy opacities within the bilateral mid and lower   lung zones, most consistent with multifocal pneumonia, to include atypical   causes. Assessment/Plan:    COVID-19 pneumonia   - Decadron 6 mg daily for 10 days - last day 8/15   - Lovenox 30 twice daily  Infectious disease consult    - remdesivir - held due to abnromal LFT - then resumed - scheduled to end 8/13.    - s/p convalescent plasma 8/6  Clinically improving. Oxygenation has been challenging - now better. Acute respiratory failure with hypoxia - ongoing - slow improvement. Secondary to COVID-19 pneumonia  Down to 6l/min - down to 2-4 l/min  I anticipate he will need short term oxygen on discharge       Hyperglycemia suspected secondary to IV Decadron. Cont sliding scale. A1C 5.8        Essential (primary) hypertension - continue home meds and monitor blood pressure       Hyperlipidemia - No current evidence of Rhabdomyolysis or other adverse effects. Continue statin therapy while in the hospital      Hx of Bipolar d/o - stable. Cont PTA regimen. DVT Prophylaxis: Lovenox  Diet: DIET GENERAL; Carb Control: 4 carb choices (60 gms)/meal  Code Status: Full Code    PT/OT Eval Status: Not applicable    Dispo - cc. Home 1-2 days. Plan for home O2 eval prior to discharge.         Bela Osman MD

## 2020-08-13 NOTE — PROGRESS NOTES
Infectious Disease Follow up Notes  Admit Date: 8/4/2020  Hospital Day: 10    Antibiotics :   Dexamethasone  IV Remdesivir resumed on 8/9   S/p Convalescent plasma on 8/6     CHIEF COMPLAINT:     COVID-19 Pneumonia  Fevers  Sob    Subjective interval History :  60 y.o. man admitted on 8/4 with fevers, sob, cough was diagnosed with COVID -19 infection as  Outpatient and we cannot see the results in Epic or in care every where but apparently had other people with him tested positive for COVID-19 per HPI as pt was recently out on vacation. He has HTN, AND hyperlipidemia. Fevers at home now in hospital at 99.5 and  Elevation in Lfts, creat normal , WBC normal with Lymphopenia.  CXR with bi lateral PNA noted and he is on  9 lts nasal cannula as his Oxygen requirements are going up we are consulted for recommendations    Able to wean O2 down to 2 lts and some sob but breathing a bit better creat stable     Past Medical History:    Past Medical History:   Diagnosis Date    GERD (gastroesophageal reflux disease)     Hyperlipidemia     Hypertension     Kidney stone        Past Surgical History:    Past Surgical History:   Procedure Laterality Date    CHOLECYSTECTOMY  08/19/2017    Lap    COLONOSCOPY  10/16/2015    CYSTOSCOPY      KIDNEY STONES    ENDOSCOPY, COLON, DIAGNOSTIC  08/08/2017    UPPER GASTROINTESTINAL ENDOSCOPY  07/14/2017       Current Medications:    Outpatient Medications Marked as Taking for the 8/4/20 encounter [facility]OS[facility] Encounter)   Medication Sig Dispense Refill    lisinopril (PRINIVIL;ZESTRIL) 20 MG tablet Take 20 mg by mouth daily      amLODIPine (NORVASC) 5 MG tablet Take 5 mg by mouth daily      pravastatin (PRAVACHOL) 40 MG tablet Take 40 mg by mouth nightly       lamoTRIgine (LAMICTAL) 150 MG tablet Take 150 mg by mouth 2 times daily      QUEtiapine (SEROQUEL) 100 MG tablet Take 100 mg by mouth 2 times daily  aspirin 81 MG EC tablet Take 81 mg by mouth daily         Allergies:  Bee pollen    Immunizations : There is no immunization history on file for this patient. Social History:     Social History     Tobacco Use    Smoking status: Never Smoker    Smokeless tobacco: Never Used   Substance Use Topics    Alcohol use: Yes     Comment: occ    Drug use: No     Social History     Tobacco Use   Smoking Status Never Smoker   Smokeless Tobacco Never Used      Family History   Problem Relation Age of Onset    Heart Disease Mother     Stroke Mother     Cancer Father         STOMACH      REVIEW OF SYSTEMS:    No fever / chills / sweats. No weight loss. No visual change, eye pain, eye discharge. No oral lesion, sore throat, dysphagia. Denies cough / sputum/Sob   Denies chest pain, palpitations/ dizziness  Denies nausea/ vomiting/abdominal pain/diarrhea. Denies dysuria or change in urinary function. Denies joint swelling or pain. No myalgia, arthralgia. No rashes, skin lesions   Denies focal weakness, sensory change or other neurologic symptoms  No lymph node swelling or tenderness. Fevers, sob+     PHYSICAL EXAM:      Vitals:    /65   Pulse 85   Temp 98.5 °F (36.9 °C) (Oral)   Resp 17   Ht 6' 4\" (1.93 m)   Wt 245 lb 2.4 oz (111.2 kg)   SpO2 93%   BMI 29.84 kg/m²     In-person bedside physical examination deferred.   Pursuant to the emergency declaration under the [facility], [facility] waiver authority and the [employer] Act, this clinical encounter was conducted to provide necessary medical care.   (Also consistent with new provisions and guidance offered by [facility] on March 18, 2020 in setting of COVID 19 outbreak and in order to preserve personal protective equipment in accordance with the flexibilities announced by CMS on March 30, 2020)   References: [email or web address]/[email or web address]/Resources/COVID-19/3_18%20Telemed%20Guidance%20Updated%20March%2018. pdf?[email or web address]=[email or web address]                      [email or web address]/Resources/COVID-19/3_18%20Telemed%20Guidance%20Updated%20March%2018. pdf?[phone]                      [email or web address] pdf                                  Pulmonary: deferred  Abdomen/GI: deferred  Neuro: deferred  Skin: deferred  Musculoskeletal:  deferred  Genitourinary: Deferred  Psych: deferred  Lymphatic/Immunologic: deferred  Data Review:    Lab Results   Component Value Date    WBC 13.4 (H) 08/13/2020    HGB 14.8 08/13/2020    HCT 43.5 08/13/2020    MCV 85.7 08/13/2020     08/13/2020     Lab Results   Component Value Date    CREATININE 0.7 (L) 08/13/2020    BUN 18 08/13/2020     08/13/2020    K 4.0 08/13/2020     08/13/2020    CO2 24 08/13/2020       Hepatic Function Panel:   Lab Results   Component Value Date    ALKPHOS 73 08/13/2020    ALT 69 08/13/2020    AST 24 08/13/2020    PROT 5.5 08/13/2020    BILITOT 0.5 08/13/2020    LABALBU 3.0 08/13/2020       UA:  Lab Results   Component Value Date    COLORU YELLOW 08/19/2017    CLARITYU Clear 08/19/2017    GLUCOSEU Negative 08/19/2017    BILIRUBINUR Negative 08/19/2017    KETUA 40 08/19/2017    SPECGRAV >1.030 08/19/2017    BLOODU Negative 08/19/2017    PHUR 7.5 08/19/2017    PROTEINU Negative 08/19/2017    UROBILINOGEN 1.0 08/19/2017    NITRU Negative 08/19/2017    LEUKOCYTESUR Negative 08/19/2017    LABMICR Not Indicated 08/19/2017    URINETYPE Not Specified 08/19/2017      Urine Microscopic: No results found for: LABCAST, BACTERIA, COMU, HYALCAST, WBCUA, RBCUA, EPIU     Lactic acid  1.5      Ferritin  3295  Up 5636        D Dimer   345     AST  140  UP  223   ALT  189  UP  209       crp  50.5       MICRO: cultures reviewed and updated by me            Culture, Blood 2 [[id number]] Collected: 08/04/20 1418    Order Status: Completed  Specimen: Blood  Updated: 08/05/20 1515     Culture, Blood 2  No Growth to date.  Any change in status will be called. Narrative:      ORDER#: [id number]                          ORDERED BY: Adrian Yin   SOURCE: Blood                              COLLECTED:  08/04/20 14:18   ANTIBIOTICS AT [facility] :                      RECEIVED :  08/04/20 14:28   If child <=2 yrs old please draw pediatric bottle. ~Blood Culture #2   Performed at:   [facility]   [address] [place] [address] [employer] [address]   Phone [phone]    Culture, Blood 1 [[id number]]   Collected: 08/04/20 1418    Order Status: Completed  Specimen: Blood  Updated: 08/05/20 1515     Blood Culture, Routine  No Growth to date.  Any change in status will be called. Narrative:      ORDER#: [id number]                          ORDERED BY: Adrian Yin   SOURCE: Blood                              COLLECTED:  08/04/20 14:18   ANTIBIOTICS AT [facility] :                      RECEIVED :  08/04/20 14:28   If child <=2 yrs old please draw pediatric bottle. ~Blood Culture #1   Performed at:   [facility]   [address] [facility], [employer], [place] [address]   Phone [phone]          IMAGING:    XR CHEST PORTABLE   Final Result   No significant interval change in multifocal bilateral airspace disease as   compared to prior. XR CHEST 1 VIEW   Final Result   Multifocal curvilinear and hazy opacities within the bilateral mid and lower   lung zones, most consistent with multifocal pneumonia, to include atypical   causes.                All the pertinent images and reports for the current Hospitalization were reviewed by me     Scheduled Meds:   insulin lispro  0-12 Units Subcutaneous TID WC    insulin lispro  0-6 Units Subcutaneous Nightly    QUEtiapine  100 mg Oral BID    [Held by provider] pravastatin  40 mg Oral Nightly    lisinopril  20 mg Oral Daily    lamoTRIgine  150 mg Oral BID    aspirin  81 mg Oral Daily    amLODIPine  5 mg Oral Daily    sodium chloride flush  10 mL Intravenous 2 times per day    enoxaparin  30 mg Subcutaneous BID    dexamethasone  6 mg Oral Daily    potassium chloride  40 mEq Oral Once       Continuous Infusions:   dextrose         PRN Meds:  glucose, dextrose, glucagon (rDNA), dextrose, dextromethorphan-guaiFENesin, sodium chloride flush, acetaminophen **OR** acetaminophen, polyethylene glycol, ondansetron, albuterol sulfate HFA, benzonatate      Assessment:     Patient Active Problem List   Diagnosis    Cholecystitis    Tenosynovitis of left wrist    COVID-19 virus infection    Acute respiratory failure due to COVID-19    Essential hypertension    Hyperlipidemia     COVID19 infection   COVID-19 Pneumonia  WBC normal with Lymphopenia  Ferritin elevation  Hypoxia using  nasal cannula  HTN   Obesity +  Presentation consistent with COVID-19 PNA     Pt had out pt COVID-19 testing and we cannot locate the results not in Epic and not in care every where  Will initiate IV Remdesivir and creat stable will consider Plasma therapy as reports now indicate giving early is more beneficial than later      Lfts elevated and have been up even before Remdesivir so likely from COVID-19 infection will have to hold Remdesivir and pt has consented for plasma and  his Ferritin is up and elevated    S/p Convalescent plasma on 8/6 and still on 10 ts Fio2 and would like Lfts to improved will resume IV Remdesivir and monitor the tests closely thus far tolerating it ok     Creat and Lfts stable and tolerating IV Remdesivir ok and Ferritin trending down CXR changes from COVID -19 infection    Will  finish IV Remdesivir therapy as planned on 8/13      Labs, Microbiology, Radiology and all the pertinent results from current hospitalization and  care every where were reviewed  by me as a part of the evaluation   Plan:   1. COVID-19 rapid

## 2020-08-13 NOTE — PLAN OF CARE
Problem: Airway Clearance - Ineffective  Goal: Achieve or maintain patent airway  Outcome: Ongoing     Problem: Gas Exchange - Impaired  Goal: Absence of hypoxia  Outcome: Ongoing  Goal: Promote optimal lung function  Outcome: Ongoing     Problem: Breathing Pattern - Ineffective  Goal: Ability to achieve and maintain a regular respiratory rate  Outcome: Ongoing     Problem:  Body Temperature -  Risk of, Imbalanced  Goal: Ability to maintain a body temperature within defined limits  Outcome: Ongoing  Goal: Will regain or maintain usual level of consciousness  Outcome: Ongoing  Goal: Complications related to the disease process, condition or treatment will be avoided or minimized  Outcome: Ongoing     Problem: Isolation Precautions - Risk of Spread of Infection  Goal: Prevent transmission of infection  Outcome: Ongoing     Problem: Nutrition Deficits  Goal: Optimize nutrtional status  Outcome: Ongoing     Problem: Risk for Fluid Volume Deficit  Goal: Maintain normal heart rhythm  Outcome: Ongoing  Goal: Maintain absence of muscle cramping  Outcome: Ongoing  Goal: Maintain normal serum potassium, sodium, calcium, phosphorus, and pH  Outcome: Ongoing     Problem: Loneliness or Risk for Loneliness  Goal: Demonstrate positive use of time alone when socialization is not possible  Outcome: Ongoing     Problem: Fatigue  Goal: Verbalize increase energy and improved vitality  Outcome: Ongoing     Problem: Patient Education: Go to Patient Education Activity  Goal: Patient/Family Education  Outcome: Ongoing     Problem: Falls - Risk of:  Goal: Will remain free from falls  Description: Will remain free from falls  Outcome: Ongoing  Goal: Absence of physical injury  Description: Absence of physical injury  Outcome: Ongoing     Problem: Discharge Planning:  Goal: Discharged to appropriate level of care  Description: Discharged to appropriate level of care  Outcome: Ongoing

## 2020-08-13 NOTE — PLAN OF CARE
Problem: Airway Clearance - Ineffective  Goal: Achieve or maintain patent airway  8/13/2020 1308 by Zina Martinez RN  Outcome: Ongoing  8/13/2020 0045 by Meek Buitrago RN  Outcome: Ongoing     Problem: Gas Exchange - Impaired  Goal: Absence of hypoxia  8/13/2020 1308 by Zina Martinez RN  Outcome: Ongoing  8/13/2020 0045 by Meek Buitrago RN  Outcome: Ongoing  Goal: Promote optimal lung function  8/13/2020 1308 by Zina Martinez RN  Outcome: Ongoing  8/13/2020 0045 by Meek Buitrago RN  Outcome: Ongoing     Problem: Breathing Pattern - Ineffective  Goal: Ability to achieve and maintain a regular respiratory rate  8/13/2020 1308 by Zina Martinez RN  Outcome: Ongoing  8/13/2020 0045 by Meek Buitrago RN  Outcome: Ongoing     Problem: Body Temperature -  Risk of, Imbalanced  Goal: Ability to maintain a body temperature within defined limits  8/13/2020 1308 by Zina Martinez RN  Outcome: Ongoing  8/13/2020 0045 by Meek Buitrago RN  Outcome: Ongoing  Goal: Will regain or maintain usual level of consciousness  8/13/2020 1308 by Zina Martinez RN  Outcome: Ongoing  8/13/2020 0045 by Meek Buitrago RN  Outcome: Ongoing  Goal: Complications related to the disease process, condition or treatment will be avoided or minimized  8/13/2020 1308 by Zina Martinez RN  Outcome: Ongoing  8/13/2020 0045 by Meek Buitrago RN  Outcome: Ongoing     Problem:  Body Temperature -  Risk of, Imbalanced  Goal: Ability to maintain a body temperature within defined limits  8/13/2020 1308 by Zina Martinez RN  Outcome: Ongoing  8/13/2020 0045 by Meek Buitrago RN  Outcome: Ongoing  Goal: Will regain or maintain usual level of consciousness  8/13/2020 1308 by Zina Martinez RN  Outcome: Ongoing  8/13/2020 0045 by Meek Buitrago RN  Outcome: Ongoing  Goal: Complications related to the disease process, condition or treatment will be avoided or minimized  8/13/2020 1308 by Zina Martinez RN  Outcome: Ongoing  8/13/2020 0045 by Meek Buitrago RN  Outcome: Ongoing     Problem: Isolation Precautions - Risk of Spread of Infection  Goal: Prevent transmission of infection  8/13/2020 1308 by Carlos Soler RN  Outcome: Ongoing  8/13/2020 0045 by Vinay Ramirez RN  Outcome: Ongoing     Problem: Nutrition Deficits  Goal: Optimize nutrtional status  8/13/2020 1308 by Carlos Soler RN  Outcome: Ongoing  8/13/2020 0045 by Vinay Ramirez RN  Outcome: Ongoing     Problem: Risk for Fluid Volume Deficit  Goal: Maintain normal heart rhythm  8/13/2020 1308 by Carlos Soler RN  Outcome: Ongoing  8/13/2020 0045 by Vinay Ramirez RN  Outcome: Ongoing  Goal: Maintain absence of muscle cramping  8/13/2020 1308 by Carlos Soler RN  Outcome: Ongoing  8/13/2020 0045 by Vinay Ramirez RN  Outcome: Ongoing  Goal: Maintain normal serum potassium, sodium, calcium, phosphorus, and pH  8/13/2020 1308 by Carlos Soler RN  Outcome: Ongoing  8/13/2020 0045 by Vinay Ramirez RN  Outcome: Ongoing     Problem: Loneliness or Risk for Loneliness  Goal: Demonstrate positive use of time alone when socialization is not possible  8/13/2020 1308 by Carlos Soler RN  Outcome: Ongoing  8/13/2020 0045 by Vinay Ramirez RN  Outcome: Ongoing     Problem: Fatigue  Goal: Verbalize increase energy and improved vitality  8/13/2020 1308 by Carlos Soler RN  Outcome: Ongoing  8/13/2020 0045 by Vinay Ramirez RN  Outcome: Ongoing     Problem: Patient Education: Go to Patient Education Activity  Goal: Patient/Family Education  8/13/2020 1308 by Carlos Soler RN  Outcome: Ongoing  8/13/2020 0045 by Vinay Ramirez RN  Outcome: Ongoing     Problem: Falls - Risk of:  Goal: Will remain free from falls  Description: Will remain free from falls  8/13/2020 1308 by Carlos Soler RN  Outcome: Ongoing  8/13/2020 0045 by Vinay Ramirez RN  Outcome: Ongoing  Goal: Absence of physical injury  Description: Absence of physical injury  8/13/2020 1308 by Carlos Soler RN  Outcome: Ongoing  8/13/2020 0045 by Vinay Ramirez RN  Outcome: Ongoing     Problem: Discharge Planning:  Goal: Discharged to appropriate level of care  Description: Discharged to appropriate level of care  8/13/2020 1308 by Liz Boyd, RN  Outcome: Ongoing  8/13/2020 0045 by Mitzy Torrez RN  Outcome: Ongoing

## 2020-08-14 VITALS
SYSTOLIC BLOOD PRESSURE: 133 MMHG | HEIGHT: 76 IN | WEIGHT: 234.79 LBS | OXYGEN SATURATION: 92 % | BODY MASS INDEX: 28.59 KG/M2 | RESPIRATION RATE: 19 BRPM | DIASTOLIC BLOOD PRESSURE: 77 MMHG | TEMPERATURE: 97.8 F | HEART RATE: 76 BPM

## 2020-08-14 LAB
GLUCOSE BLD-MCNC: 108 MG/DL (ref 70–99)
GLUCOSE BLD-MCNC: 164 MG/DL (ref 70–99)
GLUCOSE BLD-MCNC: 241 MG/DL (ref 70–99)
PERFORMED ON: ABNORMAL

## 2020-08-14 PROCEDURE — 94680 O2 UPTK RST&XERS DIR SIMPLE: CPT

## 2020-08-14 PROCEDURE — 6370000000 HC RX 637 (ALT 250 FOR IP): Performed by: INTERNAL MEDICINE

## 2020-08-14 PROCEDURE — 6360000002 HC RX W HCPCS: Performed by: INTERNAL MEDICINE

## 2020-08-14 PROCEDURE — 94761 N-INVAS EAR/PLS OXIMETRY MLT: CPT

## 2020-08-14 PROCEDURE — 2700000000 HC OXYGEN THERAPY PER DAY

## 2020-08-14 PROCEDURE — 2580000003 HC RX 258: Performed by: INTERNAL MEDICINE

## 2020-08-14 RX ADMIN — ENOXAPARIN SODIUM 30 MG: 40 INJECTION SUBCUTANEOUS at 08:26

## 2020-08-14 RX ADMIN — GUAIFENESIN AND DEXTROMETHORPHAN HYDROBROMIDE 1 TABLET: 600; 30 TABLET, EXTENDED RELEASE ORAL at 08:26

## 2020-08-14 RX ADMIN — DEXAMETHASONE 6 MG: 6 TABLET ORAL at 08:26

## 2020-08-14 RX ADMIN — LISINOPRIL 20 MG: 20 TABLET ORAL at 08:27

## 2020-08-14 RX ADMIN — QUETIAPINE FUMARATE 100 MG: 100 TABLET ORAL at 08:27

## 2020-08-14 RX ADMIN — INSULIN LISPRO 4 UNITS: 100 INJECTION, SOLUTION INTRAVENOUS; SUBCUTANEOUS at 17:09

## 2020-08-14 RX ADMIN — LAMOTRIGINE 150 MG: 100 TABLET ORAL at 08:26

## 2020-08-14 RX ADMIN — INSULIN LISPRO 2 UNITS: 100 INJECTION, SOLUTION INTRAVENOUS; SUBCUTANEOUS at 12:39

## 2020-08-14 RX ADMIN — AMLODIPINE BESYLATE 5 MG: 5 TABLET ORAL at 08:27

## 2020-08-14 RX ADMIN — SODIUM CHLORIDE, PRESERVATIVE FREE 10 ML: 5 INJECTION INTRAVENOUS at 08:27

## 2020-08-14 RX ADMIN — ASPIRIN 81 MG: 81 TABLET, COATED ORAL at 08:26

## 2020-08-14 ASSESSMENT — PAIN SCALES - GENERAL: PAINLEVEL_OUTOF10: 0

## 2020-08-14 NOTE — DISCHARGE SUMMARY
Hospital Medicine Discharge Summary    Patient ID: Bobo Li      Patient's PCP: Alea Toney Date: 8/4/2020     Discharge Date:  8/14/2020    Admitting Physician: Dorene Cardenas MD     Discharge Physician: Kye Blanco MD     Discharge Diagnoses: Active Hospital Problems    Diagnosis    COVID-19 virus infection [U07.1]    Acute respiratory failure due to COVID-19 [U07.1, J96.00]    Essential hypertension [I10]    Hyperlipidemia [E78.5]       The patient was seen and examined on day of discharge and this discharge summary is in conjunction with any daily progress note from day of discharge. Hospital Course: Patient is a 43-year-old man with history of hypertension and hyperlipidemia who presents to the emergency room for evaluation of cough and shortness of breath. He states that he was diagnosed with COVID-19 outpatient on or about 7/27/2020.      Approximately three weeks ago he was on vacation with 25 other people at the [facility] in [place]. Since that time several of his companions have been diagnosed with COVID-19. Over the past several days here developed a cough and worsening shortness of breath. In the emergency room he was found to have an oxygen saturation in the mid 80s on room air.     Patient was started on IV Remdesivir - which was stopped due to elevated liver enzymes - resumed - to complete the course. Status post convalescent plasma on 8/6     On dexamethasone - completed 10 doses on 8/14          COVID-19 pneumonia              - Decadron 6 mg daily for 10 days - last day changed to 8/14              - Lovenox 30 twice daily  Infectious disease consult               - remdesivir - held due to abnromal LFT - then resumed - finished 8/12              - s/p convalescent plasma 8/6  Clinically improving. Oxygenation has been challenging - now better.            Acute respiratory failure with hypoxia - ongoing - slow improvement.    Secondary to COVID-19 pneumonia  Down to 6l/min - down to 2-4 l/min  I anticipate he will need short term oxygen on discharge   S/p home O2 eval - will need 2l/min via NC continuous with portability - I anticipate short to intermediate term - 2-4 weeks of O2 need - will need to be reassessed OP.          Hyperglycemia suspected secondary to IV Decadron. Cont sliding scale. A1C 5.8           Essential (primary) hypertension - continue home meds and monitor blood pressure        Hyperlipidemia - No current evidence of Rhabdomyolysis or other adverse effects. Continue statin therapy while in the hospital        Hx of Bipolar d/o - stable. Cont PTA regimen.              Physical Exam Performed:     /77   Pulse 76   Temp 97.8 °F (36.6 °C) (Oral)   Resp 19   Ht 6' 4\" (1.93 m)   Wt 234 lb 12.6 oz (106.5 kg)   SpO2 92%   BMI 28.58 kg/m²       General appearance:  No apparent distress, appears stated age and cooperative. HEENT:  Normal cephalic, atraumatic without obvious deformity. Pupils equal, round, and reactive to light. Extra ocular muscles intact. Conjunctivae/corneas clear. Neck: Supple, with full range of motion. No jugular venous distention. Trachea midline. Respiratory:  Normal respiratory effort. Clear to auscultation, bilaterally without Rales/Wheezes/Rhonchi. Cardiovascular:  Regular rate and rhythm with normal S1/S2 without murmurs, rubs or gallops. Abdomen: Soft, non-tender, non-distended with normal bowel sounds. Musculoskeletal:  No clubbing, cyanosis or edema bilaterally. Full range of motion without deformity. Skin: Skin color, texture, turgor normal.  No rashes or lesions. Neurologic:  Neurovascularly intact without any focal sensory/motor deficits. Cranial nerves: II-XII intact, grossly non-focal.  Psychiatric:  Alert and oriented, thought content appropriate, normal insight  Capillary Refill: Brisk,< 3 seconds   Peripheral Pulses: +2 palpable, equal bilaterally       Labs:  For convenience and continuity at follow-up the following most recent labs are provided:      CBC:    Lab Results   Component Value Date    WBC 13.4 08/13/2020    HGB 14.8 08/13/2020    HCT 43.5 08/13/2020     08/13/2020       Renal:    Lab Results   Component Value Date     08/13/2020    K 4.0 08/13/2020     08/13/2020    CO2 24 08/13/2020    BUN 18 08/13/2020    CREATININE 0.7 08/13/2020    CALCIUM 8.3 08/13/2020         Significant Diagnostic Studies    Radiology:   XR CHEST PORTABLE   Final Result   No significant interval change in multifocal bilateral airspace disease as   compared to prior. XR CHEST 1 VIEW   Final Result   Multifocal curvilinear and hazy opacities within the bilateral mid and lower   lung zones, most consistent with multifocal pneumonia, to include atypical   causes. Consults:     IP CONSULT TO INFECTIOUS DISEASES  IP CONSULT TO PHARMACY    Disposition:  home     Condition at Discharge: Stable    Discharge Instructions/Follow-up:  PCP 1 week. Code Status:  Full Code     Activity: activity as tolerated    Diet: regular diet      Discharge Medications:     Current Discharge Medication List           Details   lisinopril (PRINIVIL;ZESTRIL) 20 MG tablet Take 20 mg by mouth daily      amLODIPine (NORVASC) 5 MG tablet Take 5 mg by mouth daily      lamoTRIgine (LAMICTAL) 150 MG tablet Take 150 mg by mouth 2 times daily      QUEtiapine (SEROQUEL) 100 MG tablet Take 100 mg by mouth 2 times daily      aspirin 81 MG EC tablet Take 81 mg by mouth daily             Time Spent on discharge is more than 30 minutes in the examination, evaluation, counseling and review of medications and discharge plan. Signed:    Argenis Douglass MD   8/14/2020      Thank you Raphael Gallardo for the opportunity to be involved in this patient's care. If you have any questions or concerns please feel free to contact me at [phone].

## 2020-08-14 NOTE — PROGRESS NOTES
[facility]    Respiratory Therapy   Home Oxygen Evaluation        Name: Michelle Hector  Medical Record Number: [id number]  Age: 60 y.o. Gender:  male   : 1955  Today's date: 2020  Room: X8W-5399/4258-01      Assessment        /77   Pulse 76   Temp 97.8 °F (36.6 °C) (Oral)   Resp 19   Ht 6' 4\" (1.93 m)   Wt 234 lb 12.6 oz (106.5 kg)   SpO2 93%   BMI 28.58 kg/m²     Patient Active Problem List   Diagnosis    Cholecystitis    Tenosynovitis of left wrist    COVID-19 virus infection    Acute respiratory failure due to COVID-19    Essential hypertension    Hyperlipidemia       Social History:  Social History     Tobacco Use    Smoking status: Never Smoker    Smokeless tobacco: Never Used   Substance Use Topics    Alcohol use: Yes     Comment: occ    Drug use: No       Patient Room Air saturation at rest 87  %  Patient Room Air saturation upon ambulation 85 %    Oxygen saturations of 88% or less on RA qualifies patient for Home Oxygen    Patient resting on 2  lmp  with an oxygen saturation of  92 %     Patient ambulated on 2 lpm with an oxygen saturation of 90%    Qualifying patient for home oxygen with ambulation and continuous flow  @ 2 lpm.      In your clinical assessment does the Patient Require Portable Oxygen Tanks?     Yes              [email or web address] contacted to follow care of patients home oxygen needs on 2020 at 12:50 PM    Patient/caregiver was educated on Home Oxygen process:  Yes      Level of patient/caregiver understanding able to:   [] Verbalize understanding   [] Demonstrate understanding       [] Teach back        [] Needs reinforcement        []  No available caregiver               []  Other:     Response to education:  Excellent     Time Spent with Home O2 Set Up:  5  minutes     Anisa Pandey RCP on 2020 at 12:50 PM

## 2020-08-14 NOTE — CARE COORDINATION
Pt to dc home w/ [employer] home O2   Electronically signed by Khadar Giron RN on 8/14/2020 at 1:45 PM      Covid+  Port tank to deliver to pts nurse and concentrator will deliver to pts home/ front porch  Electronically signed by Khadar Giron RN on 8/14/2020 at 3:08 PM'

## 2020-08-14 NOTE — PLAN OF CARE
Problem: Isolation Precautions - Risk of Spread of Infection  Goal: Prevent transmission of infection  Outcome: Ongoing

## 2020-08-14 NOTE — PLAN OF CARE
improved vitality  Outcome: Ongoing     Problem: Patient Education: Go to Patient Education Activity  Goal: Patient/Family Education  Outcome: Ongoing     Problem: Falls - Risk of:  Goal: Will remain free from falls  Description: Will remain free from falls  Outcome: Ongoing  Goal: Absence of physical injury  Description: Absence of physical injury  Outcome: Ongoing     Problem: Discharge Planning:  Goal: Discharged to appropriate level of care  Description: Discharged to appropriate level of care  Outcome: Ongoing     Problem: SAFETY  Goal: Free from accidental physical injury  Outcome: Ongoing  Goal: Free from intentional harm  Outcome: Ongoing     Problem: Breathing Pattern - Ineffective:  Goal: Ability to achieve and maintain a regular respiratory rate will improve  Description: Ability to achieve and maintain a regular respiratory rate will improve  Outcome: Ongoing     Problem: DAILY CARE  Goal: Daily care needs are met  Outcome: Ongoing     Problem: PAIN  Goal: Patient's pain/discomfort is manageable  Outcome: Ongoing     Problem: SKIN INTEGRITY  Goal: Skin integrity is maintained or improved  Outcome: Ongoing     Problem: KNOWLEDGE DEFICIT  Goal: Patient/S.O. demonstrates understanding of disease process, treatment plan, medications, and discharge instructions.   Outcome: Ongoing     Problem: DISCHARGE BARRIERS  Goal: Patient's continuum of care needs are met  Outcome: Ongoing

## 2020-08-14 NOTE — PROGRESS NOTES
Verbal and written discharge instructions were given to the patient, patient verbalizes understanding of discharge instructions including medications orders for home and possible side effects associated with them. Educated on supplemental O2 use and maintenance. Patient instructed and verbalizes understanding to call the doctor listed if they should have any complications or worsening symptoms. Verbalizes understanding about follow-up appointments. D/C IV patient tolerated well, no signs of bleeding. Patient is being discharged home with all belongings.  Electronically signed by Nadege Charles RN on 8/14/2020 at 5:15 PM

## 2020-08-14 NOTE — CARE COORDINATION
Call to [facility] to inform of dc order and home O2 orders entered. They have already discussed with patient and SW called patient in room to verify. He will call [facility] when discharging to arrange delivery tonight. Already has portable tank in room.  Will have transport home tonight    Electronically signed by CHASIDY Martin on 8/14/2020 at 4:27 PM

## 2020-08-15 ENCOUNTER — CARE COORDINATION (OUTPATIENT)
Dept: CASE MANAGEMENT | Age: 65
End: 2020-08-15

## 2020-08-15 NOTE — CARE COORDINATION
[facility] Transitions Initial Follow Up Call    Call within 2 business days of discharge: Yes    Patient: Harvinder Steinberg Patient : 1955   MRN: <[id number]>  Reason for Admission:   PNA due to COVID virus  Discharge Date: 20  RARS: Readmission Risk Score: 12      Last Discharge [facility]       Complaint Diagnosis Description Type Department Provider     Facility:   [facility]    Follow Up:  COVID-19 Risk Monitoring:    COVID-19:  Detected    Attempted to reach patient for Care Transition follow up. No answer to phone. Message left with CTN contact information and request for call back. No future appointments.     Flakita Saenz RN

## 2020-08-17 ENCOUNTER — CARE COORDINATION (OUTPATIENT)
Dept: CASE MANAGEMENT | Age: 65
End: 2020-08-17

## 2021-03-08 ENCOUNTER — NURSE ONLY (OUTPATIENT)
Dept: PRIMARY CARE CLINIC | Age: 66
End: 2021-03-08
Payer: COMMERCIAL

## 2021-03-08 DIAGNOSIS — Z23 HIGH PRIORITY FOR COVID-19 VIRUS VACCINATION: Primary | ICD-10-CM

## 2021-03-08 PROCEDURE — 0011A COVID-19, MODERNA VACCINE 100MCG/0.5ML DOSE: CPT | Performed by: NURSE PRACTITIONER

## 2021-03-08 PROCEDURE — 91301 COVID-19, MODERNA VACCINE 100MCG/0.5ML DOSE: CPT | Performed by: NURSE PRACTITIONER

## 2021-03-11 ENCOUNTER — NURSE ONLY (OUTPATIENT)
Dept: PRIMARY CARE CLINIC | Age: 66
End: 2021-03-11

## 2021-04-05 PROCEDURE — 0012A COVID-19, MODERNA VACCINE 100MCG/0.5ML DOSE: CPT | Performed by: NURSE PRACTITIONER

## 2021-04-05 PROCEDURE — 91301 COVID-19, MODERNA VACCINE 100MCG/0.5ML DOSE: CPT | Performed by: NURSE PRACTITIONER

## 2021-04-08 ENCOUNTER — NURSE ONLY (OUTPATIENT)
Dept: PRIMARY CARE CLINIC | Age: 66
End: 2021-04-08
Payer: COMMERCIAL

## 2021-04-08 DIAGNOSIS — Z23 HIGH PRIORITY FOR COVID-19 VIRUS VACCINATION: Primary | ICD-10-CM

## 2021-04-14 ENCOUNTER — APPOINTMENT (OUTPATIENT)
Dept: GENERAL RADIOLOGY | Age: 66
End: 2021-04-14
Payer: COMMERCIAL

## 2021-04-14 ENCOUNTER — HOSPITAL ENCOUNTER (EMERGENCY)
Age: 66
Discharge: HOME | End: 2021-04-14
Attending: EMERGENCY MEDICINE
Payer: COMMERCIAL

## 2021-04-14 ENCOUNTER — APPOINTMENT (OUTPATIENT)
Dept: CT IMAGING | Age: 66
End: 2021-04-14
Payer: COMMERCIAL

## 2021-04-14 VITALS
HEIGHT: 76 IN | HEART RATE: 76 BPM | DIASTOLIC BLOOD PRESSURE: 84 MMHG | WEIGHT: 242.73 LBS | SYSTOLIC BLOOD PRESSURE: 149 MMHG | RESPIRATION RATE: 13 BRPM | OXYGEN SATURATION: 97 % | TEMPERATURE: 98.1 F | BODY MASS INDEX: 29.56 KG/M2

## 2021-04-14 DIAGNOSIS — R09.1 PLEURITIS: Primary | ICD-10-CM

## 2021-04-14 DIAGNOSIS — R07.9 ACUTE CHEST PAIN: ICD-10-CM

## 2021-04-14 LAB
A/G RATIO: 2.3 (ref 1.1–2.2)
ALBUMIN SERPL-MCNC: 4.3 G/DL (ref 3.4–5)
ALP BLD-CCNC: 86 U/L (ref 40–129)
ALT SERPL-CCNC: 18 U/L (ref 10–40)
ANION GAP SERPL CALCULATED.3IONS-SCNC: 8 MMOL/L (ref 3–16)
AST SERPL-CCNC: 18 U/L (ref 15–37)
BASOPHILS ABSOLUTE: 0.1 K/UL (ref 0–0.2)
BASOPHILS RELATIVE PERCENT: 1 %
BILIRUB SERPL-MCNC: 0.8 MG/DL (ref 0–1)
BUN BLDV-MCNC: 14 MG/DL (ref 7–20)
CALCIUM SERPL-MCNC: 8.5 MG/DL (ref 8.3–10.6)
CHLORIDE BLD-SCNC: 107 MMOL/L (ref 99–110)
CO2: 24 MMOL/L (ref 21–32)
CREAT SERPL-MCNC: 0.7 MG/DL (ref 0.8–1.3)
EKG ATRIAL RATE: 82 BPM
EKG DIAGNOSIS: NORMAL
EKG P AXIS: 13 DEGREES
EKG P-R INTERVAL: 192 MS
EKG Q-T INTERVAL: 404 MS
EKG QRS DURATION: 88 MS
EKG QTC CALCULATION (BAZETT): 472 MS
EKG R AXIS: -2 DEGREES
EKG T AXIS: 46 DEGREES
EKG VENTRICULAR RATE: 82 BPM
EOSINOPHILS ABSOLUTE: 0.5 K/UL (ref 0–0.6)
EOSINOPHILS RELATIVE PERCENT: 6.2 %
GFR AFRICAN AMERICAN: >60
GFR NON-AFRICAN AMERICAN: >60
GLOBULIN: 1.9 G/DL
GLUCOSE BLD-MCNC: 110 MG/DL (ref 70–99)
HCT VFR BLD CALC: 48.2 % (ref 40.5–52.5)
HEMOGLOBIN: 16.9 G/DL (ref 13.5–17.5)
LIPASE: 28 U/L (ref 13–60)
LYMPHOCYTES ABSOLUTE: 2 K/UL (ref 1–5.1)
LYMPHOCYTES RELATIVE PERCENT: 24.4 %
MAGNESIUM: 2 MG/DL (ref 1.8–2.4)
MCH RBC QN AUTO: 29.5 PG (ref 26–34)
MCHC RBC AUTO-ENTMCNC: 35.1 G/DL (ref 31–36)
MCV RBC AUTO: 84 FL (ref 80–100)
MONOCYTES ABSOLUTE: 0.7 K/UL (ref 0–1.3)
MONOCYTES RELATIVE PERCENT: 8.4 %
NEUTROPHILS ABSOLUTE: 4.9 K/UL (ref 1.7–7.7)
NEUTROPHILS RELATIVE PERCENT: 60 %
PDW BLD-RTO: 13 % (ref 12.4–15.4)
PLATELET # BLD: 188 K/UL (ref 135–450)
PMV BLD AUTO: 7 FL (ref 5–10.5)
POTASSIUM REFLEX MAGNESIUM: 3.5 MMOL/L (ref 3.5–5.1)
RBC # BLD: 5.74 M/UL (ref 4.2–5.9)
SODIUM BLD-SCNC: 139 MMOL/L (ref 136–145)
TOTAL PROTEIN: 6.2 G/DL (ref 6.4–8.2)
TROPONIN: <0.01 NG/ML
WBC # BLD: 8.1 K/UL (ref 4–11)

## 2021-04-14 PROCEDURE — 85025 COMPLETE CBC W/AUTO DIFF WBC: CPT

## 2021-04-14 PROCEDURE — 6360000004 HC RX CONTRAST MEDICATION: Performed by: EMERGENCY MEDICINE

## 2021-04-14 PROCEDURE — 93005 ELECTROCARDIOGRAM TRACING: CPT | Performed by: EMERGENCY MEDICINE

## 2021-04-14 PROCEDURE — 96374 THER/PROPH/DIAG INJ IV PUSH: CPT

## 2021-04-14 PROCEDURE — 71260 CT THORAX DX C+: CPT

## 2021-04-14 PROCEDURE — 84484 ASSAY OF TROPONIN QUANT: CPT

## 2021-04-14 PROCEDURE — 83735 ASSAY OF MAGNESIUM: CPT

## 2021-04-14 PROCEDURE — 6360000002 HC RX W HCPCS: Performed by: EMERGENCY MEDICINE

## 2021-04-14 PROCEDURE — 93010 ELECTROCARDIOGRAM REPORT: CPT | Performed by: INTERNAL MEDICINE

## 2021-04-14 PROCEDURE — 83690 ASSAY OF LIPASE: CPT

## 2021-04-14 PROCEDURE — 99285 EMERGENCY DEPT VISIT HI MDM: CPT

## 2021-04-14 PROCEDURE — 80053 COMPREHEN METABOLIC PANEL: CPT

## 2021-04-14 RX ORDER — METHYLPREDNISOLONE 4 MG/1
4 TABLET ORAL SEE ADMIN INSTRUCTIONS
Qty: 1 KIT | Refills: 0 | Status: SHIPPED | OUTPATIENT
Start: 2021-04-14 | End: 2021-04-20

## 2021-04-14 RX ORDER — ONDANSETRON 2 MG/ML
4 INJECTION INTRAMUSCULAR; INTRAVENOUS EVERY 30 MIN PRN
Status: DISCONTINUED | OUTPATIENT
Start: 2021-04-14 | End: 2021-04-14 | Stop reason: HOSPADM

## 2021-04-14 RX ORDER — IBUPROFEN 800 MG/1
800 TABLET ORAL 3 TIMES DAILY PRN
Qty: 15 TABLET | Refills: 0 | Status: SHIPPED | OUTPATIENT
Start: 2021-04-14 | End: 2021-04-19

## 2021-04-14 RX ORDER — KETOROLAC TROMETHAMINE 30 MG/ML
30 INJECTION, SOLUTION INTRAMUSCULAR; INTRAVENOUS ONCE
Status: COMPLETED | OUTPATIENT
Start: 2021-04-14 | End: 2021-04-14

## 2021-04-14 RX ADMIN — KETOROLAC TROMETHAMINE 30 MG: 30 INJECTION, SOLUTION INTRAMUSCULAR at 09:20

## 2021-04-14 RX ADMIN — IOPAMIDOL 75 ML: 755 INJECTION, SOLUTION INTRAVENOUS at 10:01

## 2021-04-14 ASSESSMENT — PAIN SCALES - GENERAL: PAINLEVEL_OUTOF10: 0

## 2021-04-14 ASSESSMENT — PAIN DESCRIPTION - PROGRESSION: CLINICAL_PROGRESSION: GRADUALLY WORSENING

## 2021-04-14 ASSESSMENT — PAIN DESCRIPTION - PAIN TYPE: TYPE: ACUTE PAIN

## 2021-04-14 ASSESSMENT — PAIN - FUNCTIONAL ASSESSMENT: PAIN_FUNCTIONAL_ASSESSMENT: ACTIVITIES ARE NOT PREVENTED

## 2021-04-14 ASSESSMENT — PAIN DESCRIPTION - DESCRIPTORS: DESCRIPTORS: ACHING

## 2021-04-14 ASSESSMENT — PAIN DESCRIPTION - ORIENTATION: ORIENTATION: RIGHT

## 2021-04-14 ASSESSMENT — PAIN DESCRIPTION - LOCATION: LOCATION: CHEST

## 2021-04-14 NOTE — ED NOTES
Pt discharged at this time. Discharge instructions and medications reviewed,  Questions were answered. PT verbalized understanding. VSS, Afebrile. Follow up appointments were discussed.          Jennyfer Henley RN  04/14/21 5599

## 2021-04-14 NOTE — ED PROVIDER NOTES
[address]      Pt Name: Carvin Severe  MRN: [id number]  Sancheztrongfmoses 1955  Date of evaluation: 4/14/2021  Provider: Myrna Batista [address]  Chief Complaint   Patient presents with    Chest Pain     Patient c/o sterum pain that is worse with movement since waking up this morning. Patient reports pain in right chest since getting second Covid vaccine 2 weeks ago. I wore personal protective equipment when I was in the room the entire time. This includes gloves, N95 mask, face shield, and a glove over my stethoscope for protection. HPI  Carvin Severe is a 60 y.o. male who presents with chest pain in the center of his chest that started this morning at 4:45 AM.  He states it was constant at that time. He states he now has become intermittent. He denies any fever chills. Denies any coughing. Denies any nausea vomiting. He had a Covid vaccination 9 days ago. It was a second immunization. He had right-sided chest pain at that time but is having none on the right side now he describes the pain in the center of his chest that sharp and nonradiating. He states he has had intermittent shortness of breath with exertion. He has swelling of his lower extremities when the stands up and is on his feet a long time. He states the pain increases with movement or breathing. He said initially was an 8/10 and currently is 2/10. Patient is a vague historian and said it was constant as well as intermittent. REVIEW OF SYSTEMS  All systems negative except as noted in the HPI. Reviewed Nurses' notes and concur. No LMP for male patient.     PAST MEDICAL HISTORY  Past Medical History:   Diagnosis Date    COVID-19 2020    GERD (gastroesophageal reflux disease)     Hyperlipidemia     Hypertension     Kidney stone        FAMILY HISTORY  Family History   Problem Relation Age of Onset    Heart Disease Mother    Isidro Abbasi Stroke Mother     Cancer Father         STOMACH       SOCIAL HISTORY   reports that he has never smoked. He has never used smokeless tobacco. He reports current alcohol use. He reports that he does not use drugs. SURGICAL HISTORY  Past Surgical History:   Procedure Laterality Date    CHOLECYSTECTOMY  08/19/2017    Lap    COLONOSCOPY  10/16/2015    CYSTOSCOPY      KIDNEY STONES    ENDOSCOPY, COLON, DIAGNOSTIC  08/08/2017    UPPER GASTROINTESTINAL ENDOSCOPY  07/14/2017       CURRENT MEDICATIONS  Current Outpatient Rx   Medication Sig Dispense Refill    methylPREDNISolone (MEDROL, AUBREE,) 4 MG tablet Take 1 tablet by mouth See Admin Instructions for 6 days By mouth as directed on the package. 1 kit 0    ibuprofen (ADVIL;MOTRIN) 800 MG tablet Take 1 tablet by mouth 3 times daily as needed for Pain 15 tablet 0    lisinopril (PRINIVIL;ZESTRIL) 20 MG tablet Take 20 mg by mouth daily      amLODIPine (NORVASC) 5 MG tablet Take 5 mg by mouth daily      lamoTRIgine (LAMICTAL) 150 MG tablet Take 150 mg by mouth 2 times daily      QUEtiapine (SEROQUEL) 100 MG tablet Take 100 mg by mouth 2 times daily      aspirin 81 MG EC tablet Take 81 mg by mouth daily         ALLERGIES  Allergies   Allergen Reactions    Bee Pollen Other (See Comments)     Pt states has been stung by bees with no adverse affects       WELLS Criteria  ? PHYSICAL EXAM  VITAL SIGNS: BP (!) 149/84   Pulse 76   Temp 98.1 °F (36.7 °C) (Oral)   Resp 13   Ht 6' 4\" (1.93 m)   Wt 242 lb 11.6 oz (110.1 kg)   SpO2 97%   BMI 29.55 kg/m²   Constitutional: Well-developed, well-nourished, appears normal, nontoxic, activity: In company on the cart, no obvious pain, speaking full sentences. HENT: Normocephalic, Atraumatic, Bilateral ears are normal, Oropharynx moist, No oral exudates, Nose normal.  Eyes: PERRLA, EOMI, Conjunctiva normal, No discharge. No scleral icterus.   Neck: Normal range of motion, No tenderness, Supple,  Lymphatic: No lymphadenopathy noted. Cardiovascular: normal heart rate, normal rhythm, no murmurs, no clicks, no rubs, no gallops  Thorax & Lungs: normal breath sounds, no respiratory distress, no wheezing, no rales, no rhonchi  Abdomen: Soft, no tender with no distension, no masses, no pulsatile masses, no hepatosplenomegaly, normal bowel sounds  Skin: Warm, Dry, No erythema, No rash. Back: No tenderness, Full range of motion, No scoliosis. Extremities: 1+ bilateral lower extremity edema, No tenderness, No cyanosis, No clubbing. No amputations, capillary refill less than 2 seconds. Musculoskeletal: Good range of motion in all major joints, No tenderness to palpation or major deformities noted. Neurologic: Alert & oriented x 3  Psychiatric: Affect normal, Mood normal.      LABORATORY  Labs Reviewed   COMPREHENSIVE METABOLIC PANEL W/ REFLEX TO MG FOR LOW K - Abnormal; Notable for the following components:       Result Value    Glucose 110 (*)     CREATININE 0.7 (*)     Total Protein 6.2 (*)     Albumin/Globulin Ratio 2.3 (*)     All other components within normal limits    Narrative:     Performed at:  [address] [employer] [address]   Phone [phone]   CBC WITH AUTO DIFFERENTIAL    Narrative:     Performed at:  [address] [employer] [address]   Phone [phone]   LIPASE    Narrative:     Performed at:  [address] [employer] [address]   Phone [phone]   TROPONIN    Narrative:     Performed at:  [facility]  [address] [place] [address] [facility] [employer] [address]   Phone [phone]   MAGNESIUM    Narrative:     Performed at:  [facility]  [address] [place] [address] [facility] [employer] [address]   Phone [phone]   EKG  EKG Interpretation    Interpreted by emergency department physician  Time performed: tablet, R-0Normal             SEP-1 CORE MEASURE DATA  Exclusion criteria: the patient is NOT to be included for sepsis due to:  SIRS criteria are not met    Patient remained stable in the ED. CT of the chest was negative for pulmonary embolus. There are opacities in favor scarring and not pneumonia. Therefore, this is probably pleuritis. Patient was given symptomatic treatment for pleuritis instructed to follow-up with his doctor in 3 to 5 days and return if any problems. Patient ambulate [place]mers part without difficulty. He was nontoxic. The patient's blood pressure was found to be elevated according to CMS/Medicare and the Affordable Care Act/ObamaCare criteria. Elevated blood pressure could occur because of pain or anxiety or other reasons and does not mean that they need to have their blood pressure treated or medications otherwise adjusted. However, this could also be a sign that they will need to have their blood pressure treated or medications changed. The patient was instructed to follow up closely with their personal physician to have their blood pressure rechecked. The patient was instructed to take a list of recent blood pressure readings to their next visit with their personal physician. See discharge instructions for specific medications, discharge information, and treatments. They were verbally instructed to return to emergency if any problems. (This chart has been completed using [address]. Although attempts have been made to ensure accuracy, words and/or phrases may not be transcribed as intended.)    Patient refused pain medicines at the time of their exam.    IMPRESSION(S):  1. Pleuritis    2. Acute chest pain        ?   Recheck Times: [phone]    Diagnostic considerations include but are not limited to:  myocardial infarction, pulmonary embolus, pneumothorax, pneumonia, aortic dissection, empyema, musculoskeletal chest pain, pulmonary contusion, pericardial effusion, pericarditis, and referred abdominal pain.          Samir Duong DO  04/16/21 2957